# Patient Record
Sex: MALE | Race: ASIAN | NOT HISPANIC OR LATINO | URBAN - METROPOLITAN AREA
[De-identification: names, ages, dates, MRNs, and addresses within clinical notes are randomized per-mention and may not be internally consistent; named-entity substitution may affect disease eponyms.]

---

## 2019-06-05 ENCOUNTER — EMERGENCY (EMERGENCY)
Facility: HOSPITAL | Age: 49
LOS: 1 days | Discharge: HOME | End: 2019-06-05
Attending: EMERGENCY MEDICINE
Payer: COMMERCIAL

## 2019-06-05 VITALS
WEIGHT: 160.06 LBS | OXYGEN SATURATION: 97 % | HEIGHT: 68 IN | HEART RATE: 68 BPM | SYSTOLIC BLOOD PRESSURE: 129 MMHG | RESPIRATION RATE: 20 BRPM | TEMPERATURE: 98 F | DIASTOLIC BLOOD PRESSURE: 79 MMHG

## 2019-06-05 VITALS
HEART RATE: 68 BPM | OXYGEN SATURATION: 96 % | SYSTOLIC BLOOD PRESSURE: 112 MMHG | TEMPERATURE: 98 F | RESPIRATION RATE: 18 BRPM

## 2019-06-05 LAB
ALBUMIN SERPL ELPH-MCNC: 4.7 G/DL — SIGNIFICANT CHANGE UP (ref 3.3–5.2)
ALP SERPL-CCNC: 111 U/L — SIGNIFICANT CHANGE UP (ref 40–120)
ALT FLD-CCNC: 35 U/L — SIGNIFICANT CHANGE UP
ANION GAP SERPL CALC-SCNC: 16 MMOL/L — SIGNIFICANT CHANGE UP (ref 5–17)
APPEARANCE UR: CLEAR — SIGNIFICANT CHANGE UP
AST SERPL-CCNC: 34 U/L — SIGNIFICANT CHANGE UP
BACTERIA # UR AUTO: ABNORMAL
BASOPHILS # BLD AUTO: 0 K/UL — SIGNIFICANT CHANGE UP (ref 0–0.2)
BASOPHILS NFR BLD AUTO: 0.2 % — SIGNIFICANT CHANGE UP (ref 0–2)
BILIRUB SERPL-MCNC: 0.6 MG/DL — SIGNIFICANT CHANGE UP (ref 0.4–2)
BILIRUB UR-MCNC: NEGATIVE — SIGNIFICANT CHANGE UP
BUN SERPL-MCNC: 16 MG/DL — SIGNIFICANT CHANGE UP (ref 8–20)
CALCIUM SERPL-MCNC: 9.5 MG/DL — SIGNIFICANT CHANGE UP (ref 8.6–10.2)
CHLORIDE SERPL-SCNC: 103 MMOL/L — SIGNIFICANT CHANGE UP (ref 98–107)
CO2 SERPL-SCNC: 21 MMOL/L — LOW (ref 22–29)
COLOR SPEC: YELLOW — SIGNIFICANT CHANGE UP
CREAT SERPL-MCNC: 0.82 MG/DL — SIGNIFICANT CHANGE UP (ref 0.5–1.3)
DIFF PNL FLD: ABNORMAL
EOSINOPHIL # BLD AUTO: 0 K/UL — SIGNIFICANT CHANGE UP (ref 0–0.5)
EOSINOPHIL NFR BLD AUTO: 0.4 % — SIGNIFICANT CHANGE UP (ref 0–6)
EPI CELLS # UR: NEGATIVE — SIGNIFICANT CHANGE UP
GLUCOSE SERPL-MCNC: 120 MG/DL — HIGH (ref 70–115)
GLUCOSE UR QL: NEGATIVE MG/DL — SIGNIFICANT CHANGE UP
HCT VFR BLD CALC: 42.2 % — SIGNIFICANT CHANGE UP (ref 42–52)
HGB BLD-MCNC: 15.1 G/DL — SIGNIFICANT CHANGE UP (ref 14–18)
KETONES UR-MCNC: ABNORMAL
LEUKOCYTE ESTERASE UR-ACNC: ABNORMAL
LIDOCAIN IGE QN: 32 U/L — SIGNIFICANT CHANGE UP (ref 22–51)
LYMPHOCYTES # BLD AUTO: 1 K/UL — SIGNIFICANT CHANGE UP (ref 1–4.8)
LYMPHOCYTES # BLD AUTO: 12.6 % — LOW (ref 20–55)
MCHC RBC-ENTMCNC: 31.9 PG — HIGH (ref 27–31)
MCHC RBC-ENTMCNC: 35.8 G/DL — SIGNIFICANT CHANGE UP (ref 32–36)
MCV RBC AUTO: 89.2 FL — SIGNIFICANT CHANGE UP (ref 80–94)
MONOCYTES # BLD AUTO: 0.4 K/UL — SIGNIFICANT CHANGE UP (ref 0–0.8)
MONOCYTES NFR BLD AUTO: 5.5 % — SIGNIFICANT CHANGE UP (ref 3–10)
NEUTROPHILS # BLD AUTO: 6.6 K/UL — SIGNIFICANT CHANGE UP (ref 1.8–8)
NEUTROPHILS NFR BLD AUTO: 80.8 % — HIGH (ref 37–73)
NITRITE UR-MCNC: NEGATIVE — SIGNIFICANT CHANGE UP
PH UR: 6 — SIGNIFICANT CHANGE UP (ref 5–8)
PLATELET # BLD AUTO: 228 K/UL — SIGNIFICANT CHANGE UP (ref 150–400)
POTASSIUM SERPL-MCNC: 4.6 MMOL/L — SIGNIFICANT CHANGE UP (ref 3.5–5.3)
POTASSIUM SERPL-SCNC: 4.6 MMOL/L — SIGNIFICANT CHANGE UP (ref 3.5–5.3)
PROT SERPL-MCNC: 7.5 G/DL — SIGNIFICANT CHANGE UP (ref 6.6–8.7)
PROT UR-MCNC: 30 MG/DL
RBC # BLD: 4.73 M/UL — SIGNIFICANT CHANGE UP (ref 4.6–6.2)
RBC # FLD: 12.5 % — SIGNIFICANT CHANGE UP (ref 11–15.6)
RBC CASTS # UR COMP ASSIST: ABNORMAL /HPF (ref 0–4)
SODIUM SERPL-SCNC: 140 MMOL/L — SIGNIFICANT CHANGE UP (ref 135–145)
SP GR SPEC: 1.02 — SIGNIFICANT CHANGE UP (ref 1.01–1.02)
UROBILINOGEN FLD QL: NEGATIVE MG/DL — SIGNIFICANT CHANGE UP
WBC # BLD: 8.2 K/UL — SIGNIFICANT CHANGE UP (ref 4.8–10.8)
WBC # FLD AUTO: 8.2 K/UL — SIGNIFICANT CHANGE UP (ref 4.8–10.8)
WBC UR QL: SIGNIFICANT CHANGE UP

## 2019-06-05 PROCEDURE — 96361 HYDRATE IV INFUSION ADD-ON: CPT

## 2019-06-05 PROCEDURE — 99285 EMERGENCY DEPT VISIT HI MDM: CPT

## 2019-06-05 PROCEDURE — 83690 ASSAY OF LIPASE: CPT

## 2019-06-05 PROCEDURE — 96375 TX/PRO/DX INJ NEW DRUG ADDON: CPT

## 2019-06-05 PROCEDURE — 99284 EMERGENCY DEPT VISIT MOD MDM: CPT | Mod: 25

## 2019-06-05 PROCEDURE — 80053 COMPREHEN METABOLIC PANEL: CPT

## 2019-06-05 PROCEDURE — 74176 CT ABD & PELVIS W/O CONTRAST: CPT | Mod: 26

## 2019-06-05 PROCEDURE — 85027 COMPLETE CBC AUTOMATED: CPT

## 2019-06-05 PROCEDURE — 74176 CT ABD & PELVIS W/O CONTRAST: CPT

## 2019-06-05 PROCEDURE — 81001 URINALYSIS AUTO W/SCOPE: CPT

## 2019-06-05 PROCEDURE — 36415 COLL VENOUS BLD VENIPUNCTURE: CPT

## 2019-06-05 PROCEDURE — 96374 THER/PROPH/DIAG INJ IV PUSH: CPT

## 2019-06-05 PROCEDURE — 96376 TX/PRO/DX INJ SAME DRUG ADON: CPT

## 2019-06-05 RX ORDER — MORPHINE SULFATE 50 MG/1
4 CAPSULE, EXTENDED RELEASE ORAL ONCE
Refills: 0 | Status: DISCONTINUED | OUTPATIENT
Start: 2019-06-05 | End: 2019-06-05

## 2019-06-05 RX ORDER — IBUPROFEN 200 MG
1 TABLET ORAL
Qty: 30 | Refills: 0
Start: 2019-06-05 | End: 2019-06-11

## 2019-06-05 RX ORDER — SODIUM CHLORIDE 9 MG/ML
1000 INJECTION INTRAMUSCULAR; INTRAVENOUS; SUBCUTANEOUS ONCE
Refills: 0 | Status: COMPLETED | OUTPATIENT
Start: 2019-06-05 | End: 2019-06-05

## 2019-06-05 RX ORDER — TAMSULOSIN HYDROCHLORIDE 0.4 MG/1
1 CAPSULE ORAL
Qty: 15 | Refills: 0
Start: 2019-06-05 | End: 2019-06-19

## 2019-06-05 RX ORDER — KETOROLAC TROMETHAMINE 30 MG/ML
30 SYRINGE (ML) INJECTION ONCE
Refills: 0 | Status: DISCONTINUED | OUTPATIENT
Start: 2019-06-05 | End: 2019-06-05

## 2019-06-05 RX ORDER — HYDROMORPHONE HYDROCHLORIDE 2 MG/ML
0.5 INJECTION INTRAMUSCULAR; INTRAVENOUS; SUBCUTANEOUS ONCE
Refills: 0 | Status: DISCONTINUED | OUTPATIENT
Start: 2019-06-05 | End: 2019-06-05

## 2019-06-05 RX ORDER — TAMSULOSIN HYDROCHLORIDE 0.4 MG/1
0.4 CAPSULE ORAL ONCE
Refills: 0 | Status: COMPLETED | OUTPATIENT
Start: 2019-06-05 | End: 2019-06-05

## 2019-06-05 RX ORDER — HYDROMORPHONE HYDROCHLORIDE 2 MG/ML
0.5 INJECTION INTRAMUSCULAR; INTRAVENOUS; SUBCUTANEOUS EVERY 4 HOURS
Refills: 0 | Status: DISCONTINUED | OUTPATIENT
Start: 2019-06-05 | End: 2019-06-05

## 2019-06-05 RX ORDER — ONDANSETRON 8 MG/1
4 TABLET, FILM COATED ORAL ONCE
Refills: 0 | Status: COMPLETED | OUTPATIENT
Start: 2019-06-05 | End: 2019-06-05

## 2019-06-05 RX ADMIN — HYDROMORPHONE HYDROCHLORIDE 0.5 MILLIGRAM(S): 2 INJECTION INTRAMUSCULAR; INTRAVENOUS; SUBCUTANEOUS at 17:00

## 2019-06-05 RX ADMIN — MORPHINE SULFATE 4 MILLIGRAM(S): 50 CAPSULE, EXTENDED RELEASE ORAL at 16:25

## 2019-06-05 RX ADMIN — SODIUM CHLORIDE 1000 MILLILITER(S): 9 INJECTION INTRAMUSCULAR; INTRAVENOUS; SUBCUTANEOUS at 17:10

## 2019-06-05 RX ADMIN — TAMSULOSIN HYDROCHLORIDE 0.4 MILLIGRAM(S): 0.4 CAPSULE ORAL at 17:54

## 2019-06-05 RX ADMIN — MORPHINE SULFATE 4 MILLIGRAM(S): 50 CAPSULE, EXTENDED RELEASE ORAL at 16:10

## 2019-06-05 RX ADMIN — Medication 30 MILLIGRAM(S): at 16:48

## 2019-06-05 RX ADMIN — Medication 30 MILLIGRAM(S): at 16:45

## 2019-06-05 RX ADMIN — SODIUM CHLORIDE 1000 MILLILITER(S): 9 INJECTION INTRAMUSCULAR; INTRAVENOUS; SUBCUTANEOUS at 16:10

## 2019-06-05 RX ADMIN — HYDROMORPHONE HYDROCHLORIDE 0.5 MILLIGRAM(S): 2 INJECTION INTRAMUSCULAR; INTRAVENOUS; SUBCUTANEOUS at 17:54

## 2019-06-05 RX ADMIN — HYDROMORPHONE HYDROCHLORIDE 0.5 MILLIGRAM(S): 2 INJECTION INTRAMUSCULAR; INTRAVENOUS; SUBCUTANEOUS at 16:50

## 2019-06-05 RX ADMIN — HYDROMORPHONE HYDROCHLORIDE 0.5 MILLIGRAM(S): 2 INJECTION INTRAMUSCULAR; INTRAVENOUS; SUBCUTANEOUS at 18:00

## 2019-06-05 RX ADMIN — ONDANSETRON 4 MILLIGRAM(S): 8 TABLET, FILM COATED ORAL at 16:10

## 2019-06-05 NOTE — ED ADULT NURSE NOTE - NSIMPLEMENTINTERV_GEN_ALL_ED
Implemented All Universal Safety Interventions:  [place] to call system. Call bell, personal items and telephone within reach. Instruct patient to call for assistance. Room bathroom lighting operational. Non-slip footwear when patient is off stretcher. Physically safe environment: no spills, clutter or unnecessary equipment. Stretcher in lowest position, wheels locked, appropriate side rails in place.

## 2019-06-05 NOTE — ED STATDOCS - OBJECTIVE STATEMENT
50 y/o M pt with hx of renal stones, HTN and HLD presents to ED c/o worsening sudden onset of RLQ pain starting today at approx. 12:30PM, with assoc. nausea and vomiting. Pt's pain relieves while lying flat on his back. Pt has had similar pain 4-5 times in the past, last hx of renal stones was 4 years ago. States his stones passed on their own with medication. Pt is currently on Alpuniril, Amlodipine and Lipitor. NKDA. Pt is a nonsmoker. Denies back pain, diarrhea and fever. Denies prior hx of kidney dz. No further complaints at this time. 50 y/o M pt with hx of renal stones, HTN and HLD presents to ED c/o worsening sudden onset of RLQ pain starting today at approx. 12:30PM, with assoc. nausea and vomiting. Pt's pain relieves while lying flat on his back. Pt has had similar pain 4-5 times in the past, last hx of renal stones was 4 years ago. States his stones passed on their own with medication. Pt is currently on Allopurinol, Amlodipine and Lipitor. NKDA. Pt is a nonsmoker. Denies back pain, diarrhea and fever. Denies prior hx of kidney dz. No further complaints at this time.

## 2019-06-05 NOTE — ED STATDOCS - ATTENDING CONTRIBUTION TO CARE
I, Dilia Barrett, performed the initial face to face bedside interview with this patient regarding history of present illness, review of symptoms and relevant past medical, social and family history.  I completed an independent physical examination.  I was the initial provider who evaluated this patient. I have signed out the follow up of any pending tests (i.e. labs, radiological studies) to the ACP.  I have communicated the patient’s plan of care and disposition with the ACP.  The history, relevant review of systems, past medical and surgical history, medical decision making, and physical examination was documented by the scribe in my presence and I attest to the accuracy of the documentation.

## 2019-06-05 NOTE — ED STATDOCS - PHYSICAL EXAMINATION
VS:  unremarkable    GEN - NAD; well appearing; A+O x3   HEAD - NC/AT     ENT - PEERL, EOMI, mucous membranes  moist , no discharge      NECK: Neck supple, non-tender without lymphadenopathy, no masses, no JVD  PULM - CTA b/l,  symmetric breath sounds  COR -  normal heart sounds    ABD - tender to RLQ, no guarding, no rebound, no masses    BACK - R CVA tenderness, nontender spine     EXTREMS - no edema, no deformity, warm and well perfused    SKIN - no rash or bruising      NEUROLOGIC - alert, CN 2-12 intact, sensation nl, motor 5/5 RUE/LUE/RLE/LLE.      IMP: GEN - NAD; well appearing; A+O x3   HEAD - NC/AT     ENT - PEERL, EOMI, mucous membranes  moist , no discharge      NECK: Neck supple, non-tender without lymphadenopathy, no masses, no JVD  PULM - CTA b/l,  symmetric breath sounds  COR -  normal heart sounds    ABD - tender to RLQ, no guarding, no rebound, no masses ; normal BS; no distension.  BACK - R CVA tenderness, nontender spine     EXTREMS - no edema, no deformity, warm and well perfused    SKIN - no rash or bruising      NEUROLOGIC - alert, CN 2-12 intact, sensation nl, motor 5/5 RUE/LUE/RLE/LLE.

## 2019-06-05 NOTE — ED STATDOCS - PROGRESS NOTE DETAILS
Results noted and findings d/w pt. + kidney stone 3.4mm UVJ with mild hydro. Pain currently controlled. Will continue to observe and likely dc home on flomax, pain meds and urology f/u

## 2019-07-22 ENCOUNTER — APPOINTMENT (OUTPATIENT)
Dept: UROLOGY | Facility: CLINIC | Age: 49
End: 2019-07-22
Payer: MEDICAID

## 2019-07-22 VITALS
HEART RATE: 62 BPM | RESPIRATION RATE: 16 BRPM | DIASTOLIC BLOOD PRESSURE: 79 MMHG | WEIGHT: 160 LBS | SYSTOLIC BLOOD PRESSURE: 130 MMHG | HEIGHT: 67 IN | TEMPERATURE: 98.2 F | BODY MASS INDEX: 25.11 KG/M2

## 2019-07-22 PROCEDURE — 99203 OFFICE O/P NEW LOW 30 MIN: CPT

## 2019-07-22 NOTE — PHYSICAL EXAM
[General Appearance - Well Developed] : well developed [Normal Appearance] : normal appearance [General Appearance - Well Nourished] : well nourished [Well Groomed] : well groomed [General Appearance - In No Acute Distress] : no acute distress [Edema] : no peripheral edema [Exaggerated Use Of Accessory Muscles For Inspiration] : no accessory muscle use [Abdomen Soft] : soft [Respiration, Rhythm And Depth] : normal respiratory rhythm and effort [Abdomen Tenderness] : non-tender [Costovertebral Angle Tenderness] : no ~M costovertebral angle tenderness [Urethral Meatus] : meatus normal [Scrotum] : the scrotum was normal [No Prostate Nodules] : no prostate nodules [Testes Mass (___cm)] : there were no testicular masses [Urinary Bladder Findings] : the bladder was normal on palpation [] : no rash [Normal Station and Gait] : the gait and station were normal for the patient's age [Oriented To Time, Place, And Person] : oriented to person, place, and time [Affect] : the affect was normal [No Focal Deficits] : no focal deficits [Mood] : the mood was normal [Not Anxious] : not anxious [No Palpable Adenopathy] : no palpable adenopathy

## 2019-07-22 NOTE — LETTER BODY
[FreeTextEntry1] : Elliot Avendano MD\par [address]\[place] [address]\par \par Dear Dr. Avendano,\par \par Reason for visit: Abnormal urinalysis. History of renal stones\par \par This is a 49 year-old Mandarin-speaking gentleman with a history of renal stones, presenting with abnormal urinalysis. Patient is here today for evaluation, accompanied by his wife. He was last seen by me 3 years ago for renal stones. He was supposed to obtain a CT scan, but never followed up. He recently obtained an unremarkable ultrasound. Urinalysis report demonstrated abnormal albumin/creatinine ratio. He denies any pain. The patient denies any aggravating or relieving factors. The patient denies any interference of function. The patient is entirely asymptomatic. All other review of systems are negative. He has no cancer in his family medical history. He has no previous surgical history. Past medical history, family history and social history were inquired and were noncontributory to current condition. The patient does not use tobacco or drink alcohol. Medications and allergies were reviewed. He has no known allergies to medication. \par \par On examination, the patient is a healthy-appearing gentleman in no acute distress. He is alert and oriented follows commands. He has normal mood and affect. He is normocephalic. Neck is supple. Respirations are unlabored. His abdomen is soft and nontender. Bladder is nonpalpable. No CVA tenderness. Neurologically he is grossly intact. No peripheral edema. Skin without gross abnormality. He has normal male external genitalia. Normal meatus. Bilateral testes are descended intrascrotally and normal to palpation. On rectal examination, there is normal sphincter tone. The prostate is clinically benign without focal induration or nodularity.\par \par His recent PSA was 0.85, which is within normal limits. His urinalysis report demonstrated no evidence of protein in urine. His recent ultrasound was negative. \par \par Assessment: Abnormal urinalysis, possible proteinuria.\par \par I counseled the patient on the various etiologies of proteinuria. His recent urinalysis demonstrated no protein in the urine. I recommend he conduct a 24 hour urine collection to examine for protein in order to ensure stability.  I counseled the patient regarding the procedure. The risks and benefits were discussed. Alternatives were given. I answered the patient questions. The patient will take the necessary preparations for the procedure. The patient will follow-up as directed and will contact me with any questions or concerns. Thank you for the opportunity to participate in the care of Mr. SANCHEZ. I will keep you updated on his progress. \par \par Plan: 24 hour urine collection for protein. Follow up as directed.

## 2019-07-22 NOTE — ADDENDUM
[FreeTextEntry1] : Entered by Jonh Lauren, acting as scribe for Dr. Porfirio Jenkins.\par \par The documentation recorded by the scribe accurately reflects the service I personally performed and the decisions made by me.

## 2019-08-05 ENCOUNTER — CLINICAL ADVICE (OUTPATIENT)
Age: 49
End: 2019-08-05

## 2020-02-14 ENCOUNTER — EMERGENCY (EMERGENCY)
Facility: HOSPITAL | Age: 50
LOS: 1 days | End: 2020-02-14
Attending: STUDENT IN AN ORGANIZED HEALTH CARE EDUCATION/TRAINING PROGRAM | Admitting: HOSPITALIST
Payer: COMMERCIAL

## 2020-02-14 ENCOUNTER — TRANSCRIPTION ENCOUNTER (OUTPATIENT)
Age: 50
End: 2020-02-14

## 2020-02-14 VITALS
WEIGHT: 160.06 LBS | RESPIRATION RATE: 16 BRPM | TEMPERATURE: 97 F | OXYGEN SATURATION: 97 % | SYSTOLIC BLOOD PRESSURE: 145 MMHG | HEIGHT: 66 IN | DIASTOLIC BLOOD PRESSURE: 95 MMHG | HEART RATE: 87 BPM

## 2020-02-14 LAB
ALBUMIN SERPL ELPH-MCNC: 4.4 G/DL — SIGNIFICANT CHANGE UP (ref 3.3–5)
ALP SERPL-CCNC: 105 U/L — SIGNIFICANT CHANGE UP (ref 40–120)
ALT FLD-CCNC: 76 U/L — HIGH (ref 10–45)
ANION GAP SERPL CALC-SCNC: 14 MMOL/L — SIGNIFICANT CHANGE UP (ref 5–17)
AST SERPL-CCNC: 37 U/L — SIGNIFICANT CHANGE UP (ref 10–40)
BILIRUB SERPL-MCNC: 0.4 MG/DL — SIGNIFICANT CHANGE UP (ref 0.2–1.2)
BUN SERPL-MCNC: 11 MG/DL — SIGNIFICANT CHANGE UP (ref 7–23)
CALCIUM SERPL-MCNC: 9.3 MG/DL — SIGNIFICANT CHANGE UP (ref 8.4–10.5)
CHLORIDE SERPL-SCNC: 103 MMOL/L — SIGNIFICANT CHANGE UP (ref 96–108)
CO2 SERPL-SCNC: 23 MMOL/L — SIGNIFICANT CHANGE UP (ref 22–31)
CREAT SERPL-MCNC: 0.67 MG/DL — SIGNIFICANT CHANGE UP (ref 0.5–1.3)
GLUCOSE SERPL-MCNC: 102 MG/DL — HIGH (ref 70–99)
HCT VFR BLD CALC: 41.4 % — SIGNIFICANT CHANGE UP (ref 39–50)
HGB BLD-MCNC: 14.4 G/DL — SIGNIFICANT CHANGE UP (ref 13–17)
MAGNESIUM SERPL-MCNC: 2.2 MG/DL — SIGNIFICANT CHANGE UP (ref 1.6–2.6)
MCHC RBC-ENTMCNC: 31.5 PG — SIGNIFICANT CHANGE UP (ref 27–34)
MCHC RBC-ENTMCNC: 34.8 GM/DL — SIGNIFICANT CHANGE UP (ref 32–36)
MCV RBC AUTO: 90.6 FL — SIGNIFICANT CHANGE UP (ref 80–100)
NRBC # BLD: 0 /100 WBCS — SIGNIFICANT CHANGE UP (ref 0–0)
PLATELET # BLD AUTO: 199 K/UL — SIGNIFICANT CHANGE UP (ref 150–400)
POTASSIUM SERPL-MCNC: 3.9 MMOL/L — SIGNIFICANT CHANGE UP (ref 3.5–5.3)
POTASSIUM SERPL-SCNC: 3.9 MMOL/L — SIGNIFICANT CHANGE UP (ref 3.5–5.3)
PROT SERPL-MCNC: 7.2 G/DL — SIGNIFICANT CHANGE UP (ref 6–8.3)
RBC # BLD: 4.57 M/UL — SIGNIFICANT CHANGE UP (ref 4.2–5.8)
RBC # FLD: 11.9 % — SIGNIFICANT CHANGE UP (ref 10.3–14.5)
SODIUM SERPL-SCNC: 140 MMOL/L — SIGNIFICANT CHANGE UP (ref 135–145)
TROPONIN T, HIGH SENSITIVITY RESULT: <6 NG/L — SIGNIFICANT CHANGE UP (ref 0–51)
WBC # BLD: 4.83 K/UL — SIGNIFICANT CHANGE UP (ref 3.8–10.5)
WBC # FLD AUTO: 4.83 K/UL — SIGNIFICANT CHANGE UP (ref 3.8–10.5)

## 2020-02-14 RX ORDER — ASPIRIN/CALCIUM CARB/MAGNESIUM 324 MG
324 TABLET ORAL ONCE
Refills: 0 | Status: COMPLETED | OUTPATIENT
Start: 2020-02-14 | End: 2020-02-14

## 2020-02-14 RX ADMIN — Medication 324 MILLIGRAM(S): at 23:21

## 2020-02-14 NOTE — ED ADULT NURSE NOTE - OBJECTIVE STATEMENT
50 YO male with PMH HTN, HLD, DM on metformin, BPH, & kidney stone on Flomax, via walk in presenting with complaints of chest pain. As per patient, at 1900, pt developed sudden onset chest pain, described as 6/10 aching to the sternal and epigastric region, that worsens with movement, and breathing. Pt denies any recent falls or trauma. Pt denies visual disturbances, numbness/tingling, fever, chills, diaphoresis, headache, nausea, vomiting, constipation, diarrhea, or urinary symptoms.   Pt Axox4, gross neuro intact, PERRL 3 mm. Lungs clear throughout bilateral. S1S2 heard, normal sinus on cardiac monitor. Abdomen soft, non-tender, non-distended. Skin warm, dry, and intact. Pt placed in position of comfort. Pt educated on call bell system and provided call bell. Bed in lowest position, wheels locked, appropriate side rails raised. Pt denies needs at this time.  family present at bedside.

## 2020-02-14 NOTE — ED PROVIDER NOTE - PROGRESS NOTE DETAILS
Anjana PGY3: Pt assessed at beside. Pt resting comfortably, pain controlled, pt questions answered. Vital signs stable. Still with chest pain in ED despite meds, repeat ekg unchanged will admit Anjana PGY3: Pt assessed at beside. Pt resting comfortably, comfortable appearing, pt questions answered. Vital signs stable. Still with chest pain in ED despite meds, repeat ekg unchanged will admit

## 2020-02-14 NOTE — ED PROVIDER NOTE - PHYSICAL EXAMINATION
GEN APPEARANCE: WDWN, alert and cooperative, non-toxic appearing and in NAD  HEAD: Atraumatic, normocephalic   EYES: PERRLa, EOMI, vision grossly intact.   EARS: Gross hearing intact.   NOSE: No nasal discharge, no external evidence of epistaxis.   NECK: Supple  CV: RRR, S1S2, no c/r/m/g. No cyanosis or pallor. Extremities warm, well perfused. Cap refill <2 seconds. No bruits.   LUNGS: CTAB. No wheezing. No rales. No rhonchi. No diminished breath sounds.   ABDOMEN: Soft, NTND. No guarding or rebound. No masses.   MSK: Spine appears normal, no spine point tenderness. No CVA ttp. No joint erythema or tenderness. Normal muscular development. Pelvis stable.  EXTREMITIES: No peripheral edema. No obvious joint or bony deformity.  NEURO: Alert, follows commands. Speech normal. Sensation and motor normal x4 extremities.   SKIN: Normal color for race, warm, dry and intact. No evidence of rash.  PSYCH: Normal mood and affect.

## 2020-02-14 NOTE — ED PROVIDER NOTE - OBJECTIVE STATEMENT
49M hx HTN DM HLD renal stones "intraabdominal infection" on flagyl, presents with a cc of non external sudden onset substernal/L sided chest pain, worse with body movements, deep inspiration, feels as if cannot take deep breath, no recent cough congestion runny nose or sore throat, last had stress x1 year ago which was told normal, non smoker, no prior dvt/pe. No ASA prior to arrival. Former smoker. Denies n/v/f/c/. Denies headache, syncope, lightheadedness, dizziness. Denies chest palpitations, abdominal pain. Denies dysuria, hematuria, hematochezia, BRBPR, tarry stools, diarrhea, constipation.

## 2020-02-14 NOTE — ED PROVIDER NOTE - ATTENDING CONTRIBUTION TO CARE
49 M p/w wife w/ hx of DM/HTN p/w cp since 7 pm nonradiating in the middle of the chest, nothing makes it better or worse, no associated n/v/diaphoresis, no radiation of the pain down the arm nor to the jaw, pt has never had this happen before, constant pain. Pt nontoxic appearing, cp not reproducible w/ palpation 2_ radial pulse w/ no lower extremity edema, abdomen soft, nt nd, no cva tenderness plan for labs ekg w/ q waves in inferior leads, and cxr, case signed out to Dr. Jones pending labs at 4514

## 2020-02-15 ENCOUNTER — TRANSCRIPTION ENCOUNTER (OUTPATIENT)
Age: 50
End: 2020-02-15

## 2020-02-15 VITALS
HEART RATE: 69 BPM | SYSTOLIC BLOOD PRESSURE: 138 MMHG | TEMPERATURE: 98 F | DIASTOLIC BLOOD PRESSURE: 87 MMHG | RESPIRATION RATE: 18 BRPM | OXYGEN SATURATION: 98 %

## 2020-02-15 DIAGNOSIS — Z02.9 ENCOUNTER FOR ADMINISTRATIVE EXAMINATIONS, UNSPECIFIED: ICD-10-CM

## 2020-02-15 DIAGNOSIS — R07.9 CHEST PAIN, UNSPECIFIED: ICD-10-CM

## 2020-02-15 LAB
D DIMER BLD IA.RAPID-MCNC: <150 NG/ML DDU — SIGNIFICANT CHANGE UP
TROPONIN T, HIGH SENSITIVITY RESULT: <6 NG/L — SIGNIFICANT CHANGE UP (ref 0–51)

## 2020-02-15 PROCEDURE — 83735 ASSAY OF MAGNESIUM: CPT

## 2020-02-15 PROCEDURE — 80053 COMPREHEN METABOLIC PANEL: CPT

## 2020-02-15 PROCEDURE — 93005 ELECTROCARDIOGRAM TRACING: CPT

## 2020-02-15 PROCEDURE — 85379 FIBRIN DEGRADATION QUANT: CPT

## 2020-02-15 PROCEDURE — 99285 EMERGENCY DEPT VISIT HI MDM: CPT | Mod: 25

## 2020-02-15 PROCEDURE — 96375 TX/PRO/DX INJ NEW DRUG ADDON: CPT

## 2020-02-15 PROCEDURE — 71046 X-RAY EXAM CHEST 2 VIEWS: CPT

## 2020-02-15 PROCEDURE — 85027 COMPLETE CBC AUTOMATED: CPT

## 2020-02-15 PROCEDURE — 84484 ASSAY OF TROPONIN QUANT: CPT

## 2020-02-15 PROCEDURE — 96374 THER/PROPH/DIAG INJ IV PUSH: CPT

## 2020-02-15 RX ORDER — TAMSULOSIN HYDROCHLORIDE 0.4 MG/1
0.4 CAPSULE ORAL AT BEDTIME
Refills: 0 | Status: DISCONTINUED | OUTPATIENT
Start: 2020-02-15 | End: 2020-02-15

## 2020-02-15 RX ORDER — LIDOCAINE 4 G/100G
15 CREAM TOPICAL ONCE
Refills: 0 | Status: COMPLETED | OUTPATIENT
Start: 2020-02-15 | End: 2020-02-15

## 2020-02-15 RX ORDER — KETOROLAC TROMETHAMINE 30 MG/ML
15 SYRINGE (ML) INJECTION ONCE
Refills: 0 | Status: DISCONTINUED | OUTPATIENT
Start: 2020-02-15 | End: 2020-02-15

## 2020-02-15 RX ORDER — TAMSULOSIN HYDROCHLORIDE 0.4 MG/1
1 CAPSULE ORAL
Qty: 0 | Refills: 0 | DISCHARGE
Start: 2020-02-15

## 2020-02-15 RX ORDER — ACETAMINOPHEN 500 MG
975 TABLET ORAL ONCE
Refills: 0 | Status: COMPLETED | OUTPATIENT
Start: 2020-02-15 | End: 2020-02-15

## 2020-02-15 RX ORDER — INFLUENZA VIRUS VACCINE 15; 15; 15; 15 UG/.5ML; UG/.5ML; UG/.5ML; UG/.5ML
0.5 SUSPENSION INTRAMUSCULAR ONCE
Refills: 0 | Status: DISCONTINUED | OUTPATIENT
Start: 2020-02-15 | End: 2020-02-15

## 2020-02-15 RX ORDER — FAMOTIDINE 10 MG/ML
20 INJECTION INTRAVENOUS ONCE
Refills: 0 | Status: COMPLETED | OUTPATIENT
Start: 2020-02-15 | End: 2020-02-15

## 2020-02-15 RX ADMIN — Medication 15 MILLIGRAM(S): at 03:00

## 2020-02-15 RX ADMIN — Medication 30 MILLILITER(S): at 02:22

## 2020-02-15 RX ADMIN — FAMOTIDINE 20 MILLIGRAM(S): 10 INJECTION INTRAVENOUS at 02:21

## 2020-02-15 RX ADMIN — Medication 975 MILLIGRAM(S): at 01:27

## 2020-02-15 RX ADMIN — LIDOCAINE 15 MILLILITER(S): 4 CREAM TOPICAL at 02:23

## 2020-02-15 RX ADMIN — Medication 975 MILLIGRAM(S): at 00:57

## 2020-02-15 RX ADMIN — Medication 15 MILLIGRAM(S): at 02:21

## 2020-02-15 NOTE — CHART NOTE - NSCHARTNOTEFT_GEN_A_CORE
Discharge note:    Patient discharged from emergency room. At this point I do not think there are any indications for further inpatient management. Plan discussed with patient and relative at bedside. Patient comfortable and in no acute distress. Chest pain improved. Strongly suspect musculoskeletal pain. Low suspicion for primary cardiac or pulmonary. Patient's relative asked me if it could be due to acid reflux which he has been experiencing. Though I did entertain the thought of atypical presentation of CAD, again the EKG, troponins and clinical picture do not support this. If it were GERD, no indication for inpatient admission either given normal CBC and lack of alarm symptoms. Patient told to follow up with PMD within the week. Return precautions given.    Discharge time <30 minutes.

## 2020-02-15 NOTE — H&P ADULT - HISTORY OF PRESENT ILLNESS
49M with PMHx of HTN, T2DM, and nephrolithiasis presents to ED with several hour history of chest pain. Patient states he was sitting down when he went to get up and felt a sudden sharp mid-sternal chest pain that he rated as about 6/10 in severity. Patient states that the pain was worsened with deep breaths, but minimal with shallow breaths. He denies any radiation, nausea, vomiting, or palpitations. Patient states he has never had this pain before. He had a stress test approximately one year ago which was normal. Patient states that today he may have strained himself when lifting a pack of water at [employer]. While here patient developed sperate back pain which he attributes to an uncomfortable stretcher. While here patient had an EKG reviewed by me, no ST elevation or MT depression. Patient given aspirin, Maalox, Tylenol, Pepcid, and Toradol with improvement in symptoms, he is not sure what worked exactly. Patient seen walking around ED and endorsing that he wants to go home. He was admitted for stress test despite having a recent negative stress test.

## 2020-02-15 NOTE — H&P ADULT - NSHPREVIEWOFSYSTEMS_GEN_ALL_CORE
Gen: no night sweats or change in appetite  Eyes: no changes in vision or diplopia   ENT: no epistaxis, sinus pain, gingival bleeding, odynophagia or dysphagia  CV: no CP, PND or palpitations  Resp: no cough, wheezing, or hemoptysis  GI: no hematemesis, hematochezia, or melena  : no dysuria, polyuria, or hematuria  MSK: no arthralgias or joint swelling   Neuro: no gross sensory changes, numbness, focal deficits  Psych: no depression or changes in concentration  Heme/Onc: no purpura, petechiae or night sweats  Skin: no pruritus, hair loss or skin lesions  All: no photosensitivity, no complaints of anaphylaxis (SOB, throat swelling) Gen: no night sweats or change in appetite  Eyes: no changes in vision or diplopia   ENT: no epistaxis, sinus pain, gingival bleeding, odynophagia or dysphagia  CV: +CP, no palpitations  Resp: no cough, wheezing, or hemoptysis  GI: no hematemesis, hematochezia, or melena  : no dysuria, polyuria, or hematuria  MSK: no arthralgias or joint swelling   Neuro: no gross sensory changes, numbness, focal deficits  Psych: no depression or changes in concentration  Heme/Onc: no purpura, petechiae or night sweats  Skin: no pruritus, hair loss or skin lesions  All: no photosensitivity, no complaints of anaphylaxis (SOB, throat swelling)

## 2020-02-15 NOTE — ED ADULT NURSE REASSESSMENT NOTE - NS ED NURSE REASSESS COMMENT FT1
Pt admitted and received admission bed on [facility]TOWER. Pt profile and discharge note completed. Pt evaluated by admitting doctor Frankie Palumbo MD. PT verbalized understanding of discharge instructions and plan of care. ED ADULT NURSE COMPLETED.
Pt reports feeling improved after receiving medications for pain. Anjana CARRION aware. Pt updated on plan of care. Pt placed in position of comfort. Pt educated on call bell system and provided call bell. Bed in lowest position, wheels locked, appropriate side rails raised. Pt denies needs at this time.

## 2020-02-15 NOTE — DISCHARGE NOTE PROVIDER - NSDCCPCAREPLAN_GEN_ALL_CORE_FT
PRINCIPAL DISCHARGE DIAGNOSIS  Diagnosis: Chest pain  Assessment and Plan of Treatment:       SECONDARY DISCHARGE DIAGNOSES  Diagnosis: Diabetes  Assessment and Plan of Treatment:     Diagnosis: Hypertension  Assessment and Plan of Treatment:

## 2020-02-15 NOTE — ED ADULT NURSE REASSESSMENT NOTE - PAIN: RESPONSE TO INTERVENTIONS
Message noted in chart.      ----- Message from Frances Lee sent at 10/14/2017  3:18 PM CDT -----  Regarding: Patient Already Scheduled  We attempted to contact the patient to schedule the [place] Cystogram that Dr. Langley had ordered. This appointment was scheduled for 11/7/17.    [place]   Pre-Services    
no change in pain
partial relief

## 2020-02-15 NOTE — DISCHARGE NOTE PROVIDER - NSDCMRMEDTOKEN_GEN_ALL_CORE_FT
atorvastatin 80 mg oral tablet: 1 tab(s) orally once a day  metFORMIN 1000 mg oral tablet: 1 tab(s) orally 2 times a day  tamsulosin 0.4 mg oral capsule: 1 cap(s) orally once a day (at bedtime)

## 2020-02-15 NOTE — H&P ADULT - ASSESSMENT
49M with PMHx of HTN, T2DM, and nephrolithiasis presents to ED with several hour history of chest pain. It happened suddenly and is very pleuritic in nature, now improved with analgesics and NSAIDs. Patient very well appearing. Troponins have been negative x2 several hours apart and vital signs have been stable. Chest pain is reproducible on examination. I suspect musculoskeletal etiology of chest pain. He has a recent history of exerting himself at [employer]. I don't suspect CAD given unremarkable EKG and negative serial troponins. In addition, patient with recent stress test one year prior and per him unremarkable. I don't suspect pericarditis given lack of viral symptoms and unremarkable EKG. I don't suspect PE given lack of respiratory symptoms, no hypoxia, Wells' Score of 0, and negative DDimer. He developed concurrent back pain attributed to lack of comfort of stretcher. It does not radiate and his BP has been stable; thus dissection is low on differential. Patient to be discharged home to follow up with PMD. No change made to his home medication.

## 2020-02-15 NOTE — H&P ADULT - NSHPPHYSICALEXAM_GEN_ALL_CORE
T(C): 36.6 (02-15-20 @ 03:46), Max: 36.6 (02-15-20 @ 03:46)  HR: 66 (02-15-20 @ 03:46) (66 - 87)  BP: 133/90 (02-15-20 @ 03:46) (110/69 - 145/95)  RR: 16 (02-15-20 @ 03:46) (16 - 16)  SpO2: 98% (02-15-20 @ 03:46) (97% - 98%)    Gen: (fe)male in NAD, appears comfortable, no diaphoresis  HEENT: NCAT, MMM, neck soft and supple  CV: +S1/S2, no m/r/g  Resp: CTAB, no w/r/r  GI: normoactive BS, soft, NTND, no rebounding/guarding  Ext: No LE edema, extremities appear warm and well perfused   Neuro: AOx3, no focal deficits, CNII-XII grossly intact  Psych: No SI/HI/AVH, appropriate affect  Skin: no petechiae, ecchymosis or maculopapular rash noted T(C): 36.6 (02-15-20 @ 03:46), Max: 36.6 (02-15-20 @ 03:46)  HR: 66 (02-15-20 @ 03:46) (66 - 87)  BP: 133/90 (02-15-20 @ 03:46) (110/69 - 145/95)  RR: 16 (02-15-20 @ 03:46) (16 - 16)  SpO2: 98% (02-15-20 @ 03:46) (97% - 98%)    Gen: male in NAD, appears comfortable, no diaphoresis  HEENT: NCAT, MMM, neck soft and supple  CV: +S1/S2, no m/r/g, reproducible pain in mid-sternum  Resp: CTAB, no w/r/r  GI: normoactive BS, soft, NTND, no rebounding/guarding  Ext: No LE edema, extremities appear warm and well perfused   Neuro: AOx3, no focal deficits, CNII-XII grossly intact  Psych: No SI/HI/AVH, appropriate affect  Skin: no petechiae, ecchymosis or maculopapular rash noted

## 2020-02-15 NOTE — DISCHARGE NOTE PROVIDER - HOSPITAL COURSE
49M with PMHx of HTN, T2DM, and nephrolithiasis presents to ED with several hour history of chest pain. It happened suddenly and is very pleuritic in nature, now improved with analgesics and NSAIDs. Patient very well appearing. Troponins have been negative x2 several hours apart and vital signs have been stable. Chest pain is reproducible on examination. I suspect musculoskeletal etiology of chest pain. He has a recent history of exerting himself at [facility] [facility] [employer]. I don't suspect CAD given unremarkable EKG and negative serial troponins. In addition, patient with recent stress test one year prior and per him unremarkable. I don't suspect pericarditis given lack of viral symptoms and unremarkable EKG. I don't suspect PE given lack of respiratory symptoms, no hypoxia, Wells' Score of 0, and negative DDimer. He developed concurrent back pain attributed to lack of comfort of stretcher. It does not radiate and his BP has been stable; thus dissection is low on differential. Patient to be discharged home to follow up with PMD. No change made to his home medication.

## 2020-02-28 ENCOUNTER — APPOINTMENT (OUTPATIENT)
Dept: UROLOGY | Facility: CLINIC | Age: 50
End: 2020-02-28
Payer: MEDICAID

## 2020-02-28 VITALS
HEART RATE: 85 BPM | OXYGEN SATURATION: 97 % | DIASTOLIC BLOOD PRESSURE: 77 MMHG | TEMPERATURE: 97.7 F | BODY MASS INDEX: 24.9 KG/M2 | WEIGHT: 159 LBS | SYSTOLIC BLOOD PRESSURE: 119 MMHG | RESPIRATION RATE: 17 BRPM

## 2020-02-28 PROBLEM — E11.9 TYPE 2 DIABETES MELLITUS WITHOUT COMPLICATIONS: Chronic | Status: ACTIVE | Noted: 2020-02-14

## 2020-02-28 PROBLEM — E78.5 HYPERLIPIDEMIA, UNSPECIFIED: Chronic | Status: ACTIVE | Noted: 2020-02-14

## 2020-02-28 PROCEDURE — 99214 OFFICE O/P EST MOD 30 MIN: CPT

## 2020-02-28 RX ORDER — TAMSULOSIN HYDROCHLORIDE 0.4 MG/1
CAPSULE ORAL
Refills: 0 | Status: ACTIVE | COMMUNITY

## 2020-02-28 NOTE — ADDENDUM
[FreeTextEntry1] : Entered by Terrell Faustin, acting as scribe for Dr. Porfirio Jenkins.\par \par The documentation recorded by the scribe accurately reflects the service I personally performed and the decisions made by me.

## 2020-02-28 NOTE — LETTER BODY
[FreeTextEntry1] : Elliot Avendano MD\par [address]\[place] [address]\par \par Dear Dr. Avendano,\par \par Reason for visit: Abnormal urinalysis. History of renal stones. Left renal colic. Left ureteral stone and hydronephrosis.\par \par This is a 50 year-old Mandarin-speaking gentleman with a history of elevated uric acid levels and renal stones, presenting with abnormal urinalysis. He is accompanied by his wife. Patient returns today for follow-up. Since his last visit, patient underwent CT scan demonstrating left hydronephrosis secondary to a renal stone. He reports left abdominal pain. His recent 24 hour protein total demonstrated proteinuria, 116 mg/24hr. He reports that he is currently taking Flomax. He denies any pain. The patient denies any aggravating or relieving factors. The patient denies any interference of function. The patient is entirely asymptomatic. All other review of systems are negative. Past medical history, family history and social history were inquired and were unchanged. Medications and allergies were reviewed. He has no known allergies to medication. \par \par On examination, the patient is a healthy-appearing gentleman in no acute distress. He is alert and oriented follows commands. He has normal mood and affect. He is normocephalic. Neck is supple. Respirations are unlabored. His abdomen is soft and nontender. Bladder is nonpalpable. No CVA tenderness. Neurologically he is grossly intact. No peripheral edema. Skin without gross abnormality. He has normal male external genitalia. Normal meatus. Bilateral testes are descended intrascrotally and normal to palpation. On rectal examination, there is normal sphincter tone. The prostate is clinically benign without focal induration or nodularity.\par \par Patient's 24 hour protein total demonstrated 116 mg/24 hr.\par \par Patient's recent CT scan demonstrated left hydronephrosis secondary to an ureteral stone.\par \par Assessment: Abnormal urinalysis, possible proteinuria. Left renal colic. Left hydronephrosis and ureteral stone.\par \par I counseled the patient. In terms of the patient's ureteral stone and hydronephrosis, I discussed his treatment options including left ureteroscopy or continued surveillance.  I counseled the patient regarding the procedure. The risks and benefits were discussed. Alternatives were given. I answered the patient questions. The patient will take the necessary preparations for the procedure, including Activated partial thromboplastin time, BMP, CBC w/ DIFF, culture, prothrombin Time and INR. Given the size of the stone, I advised that it is passable. I encouraged the patient continue taking Flomax and increase hydration to allow passage of the stone. Patient will not require surgical intervention if he passes his stone. Given his history of elevated uric acid levls, I recommend the patient obtain a KUB X-ray to evaluate for calcium oxalate stones. The risks and benefits were discussed. Alternatives were given. I answered the patient questions. . The patient will follow-up as directed and will contact me with any questions or concerns. Thank you for the opportunity to participate in the care of Mr. SANCHEZ. I will keep you updated on his progress. \par \par Plan: KUB X-ray. Left ureteroscopy. Activated partial thromboplastin time. BMP. CBC w/ DIFF. Culture. Prothrombin Time and INR. Follow up in two weeks.

## 2020-02-29 LAB
ANION GAP SERPL CALC-SCNC: 14 MMOL/L
APTT BLD: 28.5 SEC
BACTERIA UR CULT: NORMAL
BASOPHILS # BLD AUTO: 0.04 K/UL
BASOPHILS NFR BLD AUTO: 0.8 %
BUN SERPL-MCNC: 11 MG/DL
CALCIUM SERPL-MCNC: 10.4 MG/DL
CHLORIDE SERPL-SCNC: 102 MMOL/L
CO2 SERPL-SCNC: 26 MMOL/L
CREAT SERPL-MCNC: 0.85 MG/DL
EOSINOPHIL # BLD AUTO: 0.06 K/UL
EOSINOPHIL NFR BLD AUTO: 1.2 %
GLUCOSE SERPL-MCNC: 146 MG/DL
HCT VFR BLD CALC: 46.9 %
HGB BLD-MCNC: 16.2 G/DL
IMM GRANULOCYTES NFR BLD AUTO: 2 %
INR PPP: 0.9 RATIO
LYMPHOCYTES # BLD AUTO: 0.76 K/UL
LYMPHOCYTES NFR BLD AUTO: 15.4 %
MAN DIFF?: NORMAL
MCHC RBC-ENTMCNC: 31.6 PG
MCHC RBC-ENTMCNC: 34.5 GM/DL
MCV RBC AUTO: 91.6 FL
MONOCYTES # BLD AUTO: 0.36 K/UL
MONOCYTES NFR BLD AUTO: 7.3 %
NEUTROPHILS # BLD AUTO: 3.63 K/UL
NEUTROPHILS NFR BLD AUTO: 73.3 %
PLATELET # BLD AUTO: 222 K/UL
POTASSIUM SERPL-SCNC: 4.6 MMOL/L
PT BLD: 10.1 SEC
RBC # BLD: 5.12 M/UL
RBC # FLD: 12 %
SODIUM SERPL-SCNC: 141 MMOL/L
WBC # FLD AUTO: 4.95 K/UL

## 2020-03-17 ENCOUNTER — APPOINTMENT (OUTPATIENT)
Dept: UROLOGY | Facility: CLINIC | Age: 50
End: 2020-03-17

## 2020-03-25 ENCOUNTER — APPOINTMENT (OUTPATIENT)
Dept: UROLOGY | Facility: HOSPITAL | Age: 50
End: 2020-03-25

## 2020-04-22 ENCOUNTER — APPOINTMENT (OUTPATIENT)
Dept: UROLOGY | Facility: HOSPITAL | Age: 50
End: 2020-04-22

## 2020-05-07 ENCOUNTER — OUTPATIENT (OUTPATIENT)
Dept: OUTPATIENT SERVICES | Facility: HOSPITAL | Age: 50
LOS: 1 days | End: 2020-05-07
Payer: COMMERCIAL

## 2020-05-07 ENCOUNTER — APPOINTMENT (OUTPATIENT)
Dept: UROLOGY | Facility: CLINIC | Age: 50
End: 2020-05-07
Payer: MEDICAID

## 2020-05-07 DIAGNOSIS — R35.0 FREQUENCY OF MICTURITION: ICD-10-CM

## 2020-05-07 PROCEDURE — 76775 US EXAM ABDO BACK WALL LIM: CPT

## 2020-05-07 PROCEDURE — 76775 US EXAM ABDO BACK WALL LIM: CPT | Mod: 26

## 2020-05-07 PROCEDURE — 99213 OFFICE O/P EST LOW 20 MIN: CPT

## 2020-05-07 NOTE — LETTER BODY
[FreeTextEntry1] : Elliot Avendano MD\par [address]\[place] [address]\par \par Dear Dr. Avendano,\par \par Reason for visit: History of renal stones. Left renal colic. Left ureteral stone and hydronephrosis.\par \par This is a 50 year-old Mandarin-speaking gentleman with a history of elevated uric acid levels and renal stones, presenting with left renal colic. Patient returns today for renal ultrasound. Since his last visit, he obtained a KUB X-ray demonstrating evidence of a left ureteral stone. Patient complains of intermittent left flank pain. He denies any hematuria, dysuria, or urinary incontinence. He denies any fever or chills. He was scheduled to undergo left ureteroscopy, but was unable to because there is currently still a moratorium on surgery for the COVID-19 pandemic. He denies any changes in health. The past medical history, family history and social history are unchanged. All other review of systems are negative. Patient denies any changes in medications. Medication list was reconciled. \par \par On examination, the patient is a healthy-appearing gentleman in no acute distress. He is alert and oriented follows commands. He has normal mood and affect. He is normocephalic. Neck is supple. Respirations are unlabored. His abdomen is soft and nontender. Bladder is nonpalpable. No CVA tenderness. Neurologically he is grossly intact. No peripheral edema. Skin without gross abnormality. He has normal male external genitalia. Normal meatus. Bilateral testes are descended intrascrotally and normal to palpation. On rectal examination, there is normal sphincter tone. The prostate is clinically benign without focal induration or nodularity.\par \par His recent KUB X-ray demonstrated a focus of mineralization along the medial aspect of the left renal shadow measuring 0.7 cm, possibly representing a renal or ureteric stone. \par \par I personally reviewed renal ultrasound with the patient today. Images demonstrated moderate left hydronephrosis with an obstructing stone in the proximal ureter measuring up to 9.6 mm. There are nonobstructing right renal stones measuring up to 2.6 mm in the mid to lower pole. Both kidneys are normal in size and echogenicity without solid masses visualized. \par \par Assessment: Possible proteinuria. Left renal colic. Left hydronephrosis and ureteral stone.\par \par I counseled the patient. In terms of the his left ureteral stone and hydronephrosis, I discussed his treatment option of obtaining in-office left stent placement to relieve his left flank pain. I counseled the patient regarding the procedure. The risks and benefits were discussed. Alternatives were given. I answered the patient questions. The patient declined due to the lack of significant pain. He will repeat BMP today to ensure stability. The patient will follow-up as directed and will contact me with any questions or concerns. Thank you for the opportunity to participate in the care of Mr. SANCHEZ. I will keep you updated on his progress. \par \par Plan: BMP. Follow up as directed.

## 2020-05-08 DIAGNOSIS — N23 UNSPECIFIED RENAL COLIC: ICD-10-CM

## 2020-05-08 DIAGNOSIS — R10.32 LEFT LOWER QUADRANT PAIN: ICD-10-CM

## 2020-05-08 LAB
ANION GAP SERPL CALC-SCNC: 16 MMOL/L
BUN SERPL-MCNC: 15 MG/DL
CALCIUM SERPL-MCNC: 10 MG/DL
CHLORIDE SERPL-SCNC: 103 MMOL/L
CO2 SERPL-SCNC: 23 MMOL/L
CREAT SERPL-MCNC: 0.89 MG/DL
GLUCOSE SERPL-MCNC: 118 MG/DL
POTASSIUM SERPL-SCNC: 4.3 MMOL/L
SODIUM SERPL-SCNC: 142 MMOL/L

## 2020-07-10 ENCOUNTER — OUTPATIENT (OUTPATIENT)
Dept: OUTPATIENT SERVICES | Facility: HOSPITAL | Age: 50
LOS: 1 days | End: 2020-07-10
Payer: COMMERCIAL

## 2020-07-10 VITALS
SYSTOLIC BLOOD PRESSURE: 134 MMHG | HEIGHT: 66 IN | WEIGHT: 158.07 LBS | OXYGEN SATURATION: 97 % | HEART RATE: 69 BPM | RESPIRATION RATE: 14 BRPM | TEMPERATURE: 98 F | DIASTOLIC BLOOD PRESSURE: 93 MMHG

## 2020-07-10 DIAGNOSIS — R10.32 LEFT LOWER QUADRANT PAIN: ICD-10-CM

## 2020-07-10 DIAGNOSIS — N23 UNSPECIFIED RENAL COLIC: ICD-10-CM

## 2020-07-10 DIAGNOSIS — Z29.9 ENCOUNTER FOR PROPHYLACTIC MEASURES, UNSPECIFIED: ICD-10-CM

## 2020-07-10 DIAGNOSIS — Z01.818 ENCOUNTER FOR OTHER PREPROCEDURAL EXAMINATION: ICD-10-CM

## 2020-07-10 LAB
A1C WITH ESTIMATED AVERAGE GLUCOSE RESULT: 5.9 % — HIGH (ref 4–5.6)
ANION GAP SERPL CALC-SCNC: 15 MMOL/L — SIGNIFICANT CHANGE UP (ref 5–17)
BUN SERPL-MCNC: 13 MG/DL — SIGNIFICANT CHANGE UP (ref 7–23)
CALCIUM SERPL-MCNC: 9.4 MG/DL — SIGNIFICANT CHANGE UP (ref 8.4–10.5)
CHLORIDE SERPL-SCNC: 104 MMOL/L — SIGNIFICANT CHANGE UP (ref 96–108)
CO2 SERPL-SCNC: 20 MMOL/L — LOW (ref 22–31)
CREAT SERPL-MCNC: 0.86 MG/DL — SIGNIFICANT CHANGE UP (ref 0.5–1.3)
ESTIMATED AVERAGE GLUCOSE: 123 MG/DL — HIGH (ref 68–114)
GLUCOSE SERPL-MCNC: 109 MG/DL — HIGH (ref 70–99)
HCT VFR BLD CALC: 43.2 % — SIGNIFICANT CHANGE UP (ref 39–50)
HGB BLD-MCNC: 14.9 G/DL — SIGNIFICANT CHANGE UP (ref 13–17)
MCHC RBC-ENTMCNC: 31.5 PG — SIGNIFICANT CHANGE UP (ref 27–34)
MCHC RBC-ENTMCNC: 34.5 GM/DL — SIGNIFICANT CHANGE UP (ref 32–36)
MCV RBC AUTO: 91.3 FL — SIGNIFICANT CHANGE UP (ref 80–100)
NRBC # BLD: 0 /100 WBCS — SIGNIFICANT CHANGE UP (ref 0–0)
PLATELET # BLD AUTO: 212 K/UL — SIGNIFICANT CHANGE UP (ref 150–400)
POTASSIUM SERPL-MCNC: 4 MMOL/L — SIGNIFICANT CHANGE UP (ref 3.5–5.3)
POTASSIUM SERPL-SCNC: 4 MMOL/L — SIGNIFICANT CHANGE UP (ref 3.5–5.3)
RBC # BLD: 4.73 M/UL — SIGNIFICANT CHANGE UP (ref 4.2–5.8)
RBC # FLD: 12.2 % — SIGNIFICANT CHANGE UP (ref 10.3–14.5)
SODIUM SERPL-SCNC: 139 MMOL/L — SIGNIFICANT CHANGE UP (ref 135–145)
WBC # BLD: 5.05 K/UL — SIGNIFICANT CHANGE UP (ref 3.8–10.5)
WBC # FLD AUTO: 5.05 K/UL — SIGNIFICANT CHANGE UP (ref 3.8–10.5)

## 2020-07-10 RX ORDER — CEFAZOLIN SODIUM 1 G
2000 VIAL (EA) INJECTION ONCE
Refills: 0 | Status: DISCONTINUED | OUTPATIENT
Start: 2020-07-15 | End: 2020-07-30

## 2020-07-10 NOTE — H&P PST ADULT - NSICDXPROBLEM_GEN_ALL_CORE_FT
PROBLEM DIAGNOSES  Problem: Renal colic  Assessment and Plan:     Problem: Left lower quadrant pain  Assessment and Plan:     Problem: Need for prophylactic measure  Assessment and Plan: The Caprini score indicates this patient is at risk for a VTE event (score 3-5).  Most surgical patients in this group would benefit from pharmacologic prophylaxis.  The surgical team will determine the balance between VTE risk and bleeding risk. PROBLEM DIAGNOSES  Problem: Renal colic  Assessment and Plan: -Scheduled for Left ureteroscopy; laser litotripsy; stone extraction; stent placment 7/15/20 with Dr. Jenkins  -CBC, BMP, HgbA1C, and urine culture today  -preop instructions provided  -He was advised to stop metformin 7/14 in am  -COVID scheduled     Problem: Left lower quadrant pain  Assessment and Plan:     Problem: Need for prophylactic measure  Assessment and Plan: The Caprini score indicates this patient is at risk for a VTE event (score 3-5).  Most surgical patients in this group would benefit from pharmacologic prophylaxis.  The surgical team will determine the balance between VTE risk and bleeding risk. PROBLEM DIAGNOSES  Problem: Renal colic  Assessment and Plan: -Scheduled for Left ureteroscopy; laser litotripsy; stone extraction; stent placment 7/15/20 with Dr. Jenkins  -CBC, BMP, HgbA1C, and urine culture today  -preop instructions provided  -He was advised to stop metformin 7/14 in am  -COVID scheduled 7/12/20 @ [address]    Problem: Left lower quadrant pain  Assessment and Plan:     Problem: Need for prophylactic measure  Assessment and Plan: The Caprini score indicates this patient is at risk for a VTE event (score 3-5).  Most surgical patients in this group would benefit from pharmacologic prophylaxis.  The surgical team will determine the balance between VTE risk and bleeding risk.

## 2020-07-10 NOTE — H&P PST ADULT - ATTENDING COMMENTS
Patient with left ureteral calculus. Patient wishes to proceed with left ureteroscopy, laser lithotripsy, stone extraction, stent placement. Informed consent was obtained. Alternatives were given. Risks including but not limited to bleeding, infection, risk of anesthesia, pain, failure to cure, negative ureteroscopy, stone migration, need for future procedure, and injury to surrounding structures were discussed. Patient wishes to proceed with procedure.

## 2020-07-10 NOTE — H&P PST ADULT - RS GEN PE MLT RESP DETAILS PC
breath sounds equal/no rales/no rhonchi/no wheezes/good air movement/normal/respirations non-labored/airway patent/clear to auscultation bilaterally

## 2020-07-10 NOTE — H&P PST ADULT - HISTORY OF PRESENT ILLNESS
49M with PMHx of HTN, T2DM, and nephrolithiasis presents today for presurgical testing 49M with PMHx of T2DM, hyperlipidemia, and nephrolithiasis presents today for presurgical testing.  According to Dr. Jenkins's office note dated 5/7/20 he has a history of elevated uric acid levels and renal stones, presenting with left renal colic. Patient complains of intermittent left flank pain. He denies any hematuria, dysuria, or urinary incontinence. He denies any fever or chills.  His recent KUB X-ray demonstrated a focus of mineralization along the medial aspect of the left renal shadow measuring 0.7 cm, possibly representing a renal or ureteric stone.  He is scheduled for left ureteroscopy; laser lithotripsy, stone extraction, and stent placement with Dr. Porfirio Jenkins on 7/15/20.     COVID scheduled for   PCP        I personally reviewed renal ultrasound with the patient today. Images demonstrated moderate left hydronephrosis with an obstructing stone in the proximal ureter measuring up to 9.6 mm. There are nonobstructing right renal stones measuring up to 2.6 mm in the mid to lower pole. Both kidneys are normal in size and echogenicity without solid masses visualized. 49M with PMHx of T2DM, hyperlipidemia, and nephrolithiasis presents today for presurgical testing.  According to Dr. Jenkins's office note dated 5/7/20 he has a history of elevated uric acid levels and renal stones, presenting with left renal colic. Patient complains of intermittent left flank pain. He denies any hematuria, dysuria, or urinary incontinence. He denies any fever or chills.  His recent KUB X-ray demonstrated a focus of mineralization along the medial aspect of the left renal shadow measuring 0.7 cm, possibly representing a renal or ureteric stone.   According to Dr. Jenkins's office note dated 5/7/20 he personally reviewed the renal ultrasound which demonstrated moderate left hydronephrosis with an obstructing stone in the proximal ureter measuring up to 9.6 mm.  There are nonobstructing right renal stones measuring up to 2.6 mm in the mid to lower pole. Both kidneys are normal in size and echogenicity without solid masses visualized.  He is scheduled for left ureteroscopy; laser lithotripsy, stone extraction, and stent placement with Dr. Porfirio Jenkins on 7/15/20.     COVID scheduled for 49M with PMHx of T2DM, hyperlipidemia, and nephrolithiasis presents today for presurgical testing.  According to Dr. Jenkins's office note dated 5/7/20 he has a history of elevated uric acid levels and renal stones, presenting with left renal colic. Patient complains of intermittent left flank pain. He denies any hematuria, dysuria, or urinary incontinence. He denies any fever or chills.  His recent KUB X-ray demonstrated a focus of mineralization along the medial aspect of the left renal shadow measuring 0.7 cm, possibly representing a renal or ureteric stone.   According to Dr. Jenkins's office note dated 5/7/20 he personally reviewed the renal ultrasound which demonstrated moderate left hydronephrosis with an obstructing stone in the proximal ureter measuring up to 9.6 mm.  There are nonobstructing right renal stones measuring up to 2.6 mm in the mid to lower pole. Both kidneys are normal in size and echogenicity without solid masses visualized.  He is scheduled for left ureteroscopy; laser lithotripsy, stone extraction, and stent placement with Dr. Porfirio Jenkins on 7/15/20.     COVID scheduled for 7/12/20 @ [address]

## 2020-07-10 NOTE — H&P PST ADULT - ASSESSMENT
CAPRINI SCORE [CLOT]    AGE RELATED RISK FACTORS                                                       MOBILITY RELATED FACTORS  [X] Age 41-60 years                                            (1 Point)                  [ ] Bed rest                                                        (1 Point)  [ ] Age: 61-74 years                                           (2 Points)                 [ ] Plaster cast                                                   (2 Points)  [ ] Age= 75 years                                              (3 Points)                 [ ] Bed bound for more than 72 hours                 (2 Points)    DISEASE RELATED RISK FACTORS                                               GENDER SPECIFIC FACTORS  [ ] Edema in the lower extremities                       (1 Point)                  [ ] Pregnancy                                                     (1 Point)  [ ] Varicose veins                                               (1 Point)                  [ ] Post-partum < 6 weeks                                   (1 Point)             [ ] BMI > 25 Kg/m2                                            (1 Point)                  [ ] Hormonal therapy  or oral contraception          (1 Point)                 [ ] Sepsis (in the previous month)                        (1 Point)                  [ ] History of pregnancy complications                 (1 point)  [ ] Pneumonia or serious lung disease                                               [ ] Unexplained or recurrent                     (1 Point)           (in the previous month)                               (1 Point)  [ ] Abnormal pulmonary function test                     (1 Point)                 SURGERY RELATED RISK FACTORS  [ ] Acute myocardial infarction                              (1 Point)                 [ ]  Section                                             (1 Point)  [ ] Congestive heart failure (in the previous month)  (1 Point)               [ ] Minor surgery                                                  (1 Point)   [ ] Inflammatory bowel disease                             (1 Point)                 [ ] Arthroscopic surgery                                        (2 Points)  [ ] Central venous access                                      (2 Points)                [X] General surgery lasting more than 45 minutes   (2 Points)       [ ] Stroke (in the previous month)                          (5 Points)               [ ] Elective arthroplasty                                         (5 Points)                                                                                                                                               HEMATOLOGY RELATED FACTORS                                                 TRAUMA RELATED RISK FACTORS  [ ] Prior episodes of VTE                                     (3 Points)                [ ] Fracture of the hip, pelvis, or leg                       (5 Points)  [ ] Positive family history for VTE                         (3 Points)                 [ ] Acute spinal cord injury (in the previous month)  (5 Points)  [ ] Prothrombin 48064 A                                     (3 Points)                 [ ] Paralysis  (less than 1 month)                             (5 Points)  [ ] Factor V Leiden                                             (3 Points)                  [ ] Multiple Trauma within 1 month                        (5 Points)  [ ] Lupus anticoagulants                                     (3 Points)                                                           [ ] Anticardiolipin antibodies                               (3 Points)                                                       [ ] High homocysteine in the blood                      (3 Points)                                             [ ] Other congenital or acquired thrombophilia      (3 Points)                                                [ ] Heparin induced thrombocytopenia                  (3 Points)                                          Total Score [  3  ] CAPRINI SCORE [CLOT]    AGE RELATED RISK FACTORS                                                       MOBILITY RELATED FACTORS  [X] Age 41-60 years                                            (1 Point)                  [ ] Bed rest                                                        (1 Point)  [ ] Age: 61-74 years                                           (2 Points)                 [ ] Plaster cast                                                   (2 Points)  [ ] Age= 75 years                                              (3 Points)                 [ ] Bed bound for more than 72 hours                 (2 Points)    DISEASE RELATED RISK FACTORS                                               GENDER SPECIFIC FACTORS  [ ] Edema in the lower extremities                       (1 Point)                  [ ] Pregnancy                                                     (1 Point)  [ ] Varicose veins                                               (1 Point)                  [ ] Post-partum < 6 weeks                                   (1 Point)             [X] BMI > 25 Kg/m2                                            (1 Point)                  [ ] Hormonal therapy  or oral contraception          (1 Point)                 [ ] Sepsis (in the previous month)                        (1 Point)                  [ ] History of pregnancy complications                 (1 point)  [ ] Pneumonia or serious lung disease                                               [ ] Unexplained or recurrent                     (1 Point)           (in the previous month)                               (1 Point)  [ ] Abnormal pulmonary function test                     (1 Point)                 SURGERY RELATED RISK FACTORS  [ ] Acute myocardial infarction                              (1 Point)                 [ ]  Section                                             (1 Point)  [ ] Congestive heart failure (in the previous month)  (1 Point)               [ ] Minor surgery                                                  (1 Point)   [ ] Inflammatory bowel disease                             (1 Point)                 [ ] Arthroscopic surgery                                        (2 Points)  [ ] Central venous access                                      (2 Points)                [X] General surgery lasting more than 45 minutes   (2 Points)       [ ] Stroke (in the previous month)                          (5 Points)               [ ] Elective arthroplasty                                         (5 Points)                                                                                                                                               HEMATOLOGY RELATED FACTORS                                                 TRAUMA RELATED RISK FACTORS  [ ] Prior episodes of VTE                                     (3 Points)                [ ] Fracture of the hip, pelvis, or leg                       (5 Points)  [ ] Positive family history for VTE                         (3 Points)                 [ ] Acute spinal cord injury (in the previous month)  (5 Points)  [ ] Prothrombin 80219 A                                     (3 Points)                 [ ] Paralysis  (less than 1 month)                             (5 Points)  [ ] Factor V Leiden                                             (3 Points)                  [ ] Multiple Trauma within 1 month                        (5 Points)  [ ] Lupus anticoagulants                                     (3 Points)                                                           [ ] Anticardiolipin antibodies                               (3 Points)                                                       [ ] High homocysteine in the blood                      (3 Points)                                             [ ] Other congenital or acquired thrombophilia      (3 Points)                                                [ ] Heparin induced thrombocytopenia                  (3 Points)                                          Total Score [  4  ]

## 2020-07-11 LAB
CULTURE RESULTS: SIGNIFICANT CHANGE UP
SPECIMEN SOURCE: SIGNIFICANT CHANGE UP

## 2020-07-12 ENCOUNTER — APPOINTMENT (OUTPATIENT)
Dept: DISASTER EMERGENCY | Facility: CLINIC | Age: 50
End: 2020-07-12

## 2020-07-14 ENCOUNTER — TRANSCRIPTION ENCOUNTER (OUTPATIENT)
Age: 50
End: 2020-07-14

## 2020-07-14 LAB — SARS-COV-2 N GENE NPH QL NAA+PROBE: NOT DETECTED

## 2020-07-15 ENCOUNTER — APPOINTMENT (OUTPATIENT)
Dept: UROLOGY | Facility: HOSPITAL | Age: 50
End: 2020-07-15

## 2020-07-15 ENCOUNTER — RESULT REVIEW (OUTPATIENT)
Age: 50
End: 2020-07-15

## 2020-07-15 ENCOUNTER — OUTPATIENT (OUTPATIENT)
Dept: OUTPATIENT SERVICES | Facility: HOSPITAL | Age: 50
LOS: 1 days | End: 2020-07-15
Payer: COMMERCIAL

## 2020-07-15 VITALS
SYSTOLIC BLOOD PRESSURE: 115 MMHG | HEART RATE: 72 BPM | OXYGEN SATURATION: 95 % | DIASTOLIC BLOOD PRESSURE: 79 MMHG | RESPIRATION RATE: 18 BRPM | TEMPERATURE: 98 F

## 2020-07-15 VITALS
TEMPERATURE: 98 F | WEIGHT: 158.07 LBS | HEIGHT: 66 IN | SYSTOLIC BLOOD PRESSURE: 142 MMHG | HEART RATE: 73 BPM | DIASTOLIC BLOOD PRESSURE: 89 MMHG | OXYGEN SATURATION: 98 % | RESPIRATION RATE: 18 BRPM

## 2020-07-15 DIAGNOSIS — N20.9 URINARY CALCULUS, UNSPECIFIED: ICD-10-CM

## 2020-07-15 DIAGNOSIS — N23 UNSPECIFIED RENAL COLIC: ICD-10-CM

## 2020-07-15 DIAGNOSIS — R10.32 LEFT LOWER QUADRANT PAIN: ICD-10-CM

## 2020-07-15 DIAGNOSIS — Z01.818 ENCOUNTER FOR OTHER PREPROCEDURAL EXAMINATION: ICD-10-CM

## 2020-07-15 LAB
GLUCOSE BLDC GLUCOMTR-MCNC: 111 MG/DL — HIGH (ref 70–99)
GLUCOSE BLDC GLUCOMTR-MCNC: 118 MG/DL — HIGH (ref 70–99)

## 2020-07-15 PROCEDURE — 52356 CYSTO/URETERO W/LITHOTRIPSY: CPT | Mod: LT

## 2020-07-15 PROCEDURE — 76000 FLUOROSCOPY <1 HR PHYS/QHP: CPT

## 2020-07-15 PROCEDURE — 87086 URINE CULTURE/COLONY COUNT: CPT

## 2020-07-15 PROCEDURE — 80048 BASIC METABOLIC PNL TOTAL CA: CPT

## 2020-07-15 PROCEDURE — G0463: CPT

## 2020-07-15 PROCEDURE — 83036 HEMOGLOBIN GLYCOSYLATED A1C: CPT

## 2020-07-15 PROCEDURE — 88300 SURGICAL PATH GROSS: CPT | Mod: 26

## 2020-07-15 PROCEDURE — 85027 COMPLETE CBC AUTOMATED: CPT

## 2020-07-15 PROCEDURE — 74420 UROGRAPHY RTRGR +-KUB: CPT | Mod: 26

## 2020-07-15 RX ORDER — LIDOCAINE HCL 20 MG/ML
0.2 VIAL (ML) INJECTION ONCE
Refills: 0 | Status: DISCONTINUED | OUTPATIENT
Start: 2020-07-15 | End: 2020-07-15

## 2020-07-15 RX ORDER — ACETAMINOPHEN 500 MG
1000 TABLET ORAL ONCE
Refills: 0 | Status: COMPLETED | OUTPATIENT
Start: 2020-07-15 | End: 2020-07-15

## 2020-07-15 RX ORDER — SODIUM CHLORIDE 9 MG/ML
3 INJECTION INTRAMUSCULAR; INTRAVENOUS; SUBCUTANEOUS EVERY 8 HOURS
Refills: 0 | Status: DISCONTINUED | OUTPATIENT
Start: 2020-07-15 | End: 2020-07-15

## 2020-07-15 RX ORDER — ATORVASTATIN CALCIUM 80 MG/1
1 TABLET, FILM COATED ORAL
Qty: 0 | Refills: 0 | DISCHARGE

## 2020-07-15 RX ORDER — KETOROLAC TROMETHAMINE 30 MG/ML
30 SYRINGE (ML) INJECTION ONCE
Refills: 0 | Status: DISCONTINUED | OUTPATIENT
Start: 2020-07-15 | End: 2020-07-15

## 2020-07-15 RX ORDER — CEPHALEXIN 500 MG
1 CAPSULE ORAL
Qty: 9 | Refills: 0
Start: 2020-07-15 | End: 2020-07-17

## 2020-07-15 RX ORDER — METFORMIN HYDROCHLORIDE 850 MG/1
1 TABLET ORAL
Qty: 0 | Refills: 0 | DISCHARGE

## 2020-07-15 RX ORDER — FENTANYL CITRATE 50 UG/ML
50 INJECTION INTRAVENOUS
Refills: 0 | Status: DISCONTINUED | OUTPATIENT
Start: 2020-07-15 | End: 2020-07-15

## 2020-07-15 RX ADMIN — Medication 1000 MILLIGRAM(S): at 18:45

## 2020-07-15 RX ADMIN — Medication 400 MILLIGRAM(S): at 18:07

## 2020-07-15 NOTE — ASU DISCHARGE PLAN (ADULT/PEDIATRIC) - CARE PROVIDER_API CALL
Porfirio Jenkins  UROLOGY  [address] [place] [address]  Phone: [phone]  Fax: [phone]  Follow Up Time:

## 2020-07-15 NOTE — PRE-ANESTHESIA EVALUATION ADULT - NSANTHRISKNONERD_GEN_ALL_CORE
No risk alerts present

## 2020-07-15 NOTE — ASU DISCHARGE PLAN (ADULT/PEDIATRIC) - ASU DC SPECIAL INSTRUCTIONSFT
You may take up to 650mg of tylenol every 6 hours for pain.  You can alternate this with ibuprofen 400mg every 6 hours.  Do not take more than 4000mg of tylenol or 2400mg of ibuprofen in one day.    We have sent 3 days keflex to your pharmacy.    Please f/u with Dr. Pizarro in 1-2 weeks.

## 2020-07-16 PROCEDURE — C2617: CPT

## 2020-07-16 PROCEDURE — C1758: CPT

## 2020-07-16 PROCEDURE — 82365 CALCULUS SPECTROSCOPY: CPT

## 2020-07-16 PROCEDURE — C1889: CPT

## 2020-07-16 PROCEDURE — 88300 SURGICAL PATH GROSS: CPT

## 2020-07-16 PROCEDURE — C1769: CPT

## 2020-07-16 PROCEDURE — 52356 CYSTO/URETERO W/LITHOTRIPSY: CPT | Mod: LT

## 2020-07-16 PROCEDURE — 82962 GLUCOSE BLOOD TEST: CPT

## 2020-07-20 LAB — SURGICAL PATHOLOGY STUDY: SIGNIFICANT CHANGE UP

## 2020-07-23 LAB — NIDUS STONE QN: SIGNIFICANT CHANGE UP

## 2020-07-27 ENCOUNTER — APPOINTMENT (OUTPATIENT)
Dept: UROLOGY | Facility: CLINIC | Age: 50
End: 2020-07-27

## 2020-07-27 ENCOUNTER — OUTPATIENT (OUTPATIENT)
Dept: OUTPATIENT SERVICES | Facility: HOSPITAL | Age: 50
LOS: 1 days | End: 2020-07-27
Payer: COMMERCIAL

## 2020-07-27 ENCOUNTER — APPOINTMENT (OUTPATIENT)
Dept: UROLOGY | Facility: CLINIC | Age: 50
End: 2020-07-27
Payer: MEDICAID

## 2020-07-27 VITALS — SYSTOLIC BLOOD PRESSURE: 117 MMHG | DIASTOLIC BLOOD PRESSURE: 75 MMHG | HEART RATE: 75 BPM

## 2020-07-27 VITALS — TEMPERATURE: 98.1 F

## 2020-07-27 DIAGNOSIS — R35.0 FREQUENCY OF MICTURITION: ICD-10-CM

## 2020-07-27 DIAGNOSIS — N23 UNSPECIFIED RENAL COLIC: ICD-10-CM

## 2020-07-27 DIAGNOSIS — Z01.818 ENCOUNTER FOR OTHER PREPROCEDURAL EXAMINATION: ICD-10-CM

## 2020-07-27 DIAGNOSIS — R10.32 LEFT LOWER QUADRANT PAIN: ICD-10-CM

## 2020-07-27 DIAGNOSIS — Z00.00 ENCOUNTER FOR GENERAL ADULT MEDICAL EXAMINATION W/OUT ABNORMAL FINDINGS: ICD-10-CM

## 2020-07-27 PROCEDURE — 52310 CYSTOSCOPY AND TREATMENT: CPT

## 2020-07-27 RX ORDER — OXYCODONE AND ACETAMINOPHEN 5; 325 MG/1; MG/1
5-325 TABLET ORAL
Qty: 6 | Refills: 0 | Status: ACTIVE | COMMUNITY
Start: 2020-07-27 | End: 1900-01-01

## 2020-07-29 LAB
APPEARANCE: CLEAR
BACTERIA UR CULT: NORMAL
BACTERIA: NEGATIVE
BILIRUBIN URINE: NEGATIVE
BLOOD URINE: ABNORMAL
COLOR: COLORLESS
GLUCOSE QUALITATIVE U: NEGATIVE
HYALINE CASTS: 0 /LPF
KETONES URINE: NEGATIVE
LEUKOCYTE ESTERASE URINE: NEGATIVE
MICROSCOPIC-UA: NORMAL
NITRITE URINE: NEGATIVE
PH URINE: 6.5
PROTEIN URINE: NORMAL
RED BLOOD CELLS URINE: 33 /HPF
SPECIFIC GRAVITY URINE: 1.01
SQUAMOUS EPITHELIAL CELLS: 1 /HPF
UROBILINOGEN URINE: NORMAL
WHITE BLOOD CELLS URINE: 2 /HPF

## 2020-07-30 DIAGNOSIS — Z01.818 ENCOUNTER FOR OTHER PREPROCEDURAL EXAMINATION: ICD-10-CM

## 2020-07-30 DIAGNOSIS — R10.32 LEFT LOWER QUADRANT PAIN: ICD-10-CM

## 2020-07-30 DIAGNOSIS — N23 UNSPECIFIED RENAL COLIC: ICD-10-CM

## 2020-08-24 ENCOUNTER — APPOINTMENT (OUTPATIENT)
Dept: UROLOGY | Facility: CLINIC | Age: 50
End: 2020-08-24

## 2020-08-24 VITALS — TEMPERATURE: 98.1 F

## 2020-08-24 NOTE — LETTER BODY
[FreeTextEntry1] : Elliot Avendano MD\par [address]\[place] [address]\par \par Dear Dr. Avendano,\par \par Reason for visit: History of renal stones. Left ureteral stone.\par \par This is a 50 year-old Mandarin-speaking gentleman with a history of elevated uric acid levels and renal stones, presenting with a left ureteral stone s/p ureteroscopy and stone extraction in July. Patient returns today for renal ultrasound. However, patient is unable to obtain renal ultrasound today due to insurance pre-authorization requirements. He denies any changes in health. He denies any pain. The past medical history, family history and social history are unchanged. All other review of systems are negative. Patient denies any changes in medications. Medication list was reconciled. \par \par On examination, the patient is a healthy-appearing gentleman in no acute distress. He is alert and oriented follows commands. He has normal mood and affect. He is normocephalic. Neck is supple. Respirations are unlabored. His abdomen is soft and nontender. Bladder is nonpalpable. No CVA tenderness. Neurologically he is grossly intact. No peripheral edema. Skin without gross abnormality. He has normal male external genitalia. Normal meatus. Bilateral testes are descended intrascrotally and normal to palpation. On rectal examination, there is normal sphincter tone. The prostate is clinically benign without focal induration or nodularity.\par \par Assessment: Left hydronephrosis and ureteral stone s/p left ureteroscopy.\par \par I counseled the patient. He was unable to obtain a renal ultrasound today due to insurance pre-authorization requirements. He may obtain a renal ultrasound when he is next available. The risks and benefits were discussed. Alternatives were given. I answered the patient questions.The patient will follow-up as directed and will contact me with any questions or concerns. Thank you for the opportunity to participate in the care of Mr. SANCHEZ. I will keep you updated on his progress. \par \par Plan: Renal ultrasound. Follow up as directed.

## 2020-09-03 ENCOUNTER — OUTPATIENT (OUTPATIENT)
Dept: OUTPATIENT SERVICES | Facility: HOSPITAL | Age: 50
LOS: 1 days | End: 2020-09-03
Payer: COMMERCIAL

## 2020-09-03 ENCOUNTER — APPOINTMENT (OUTPATIENT)
Dept: UROLOGY | Facility: CLINIC | Age: 50
End: 2020-09-03
Payer: MEDICAID

## 2020-09-03 VITALS — TEMPERATURE: 97.9 F

## 2020-09-03 DIAGNOSIS — R35.0 FREQUENCY OF MICTURITION: ICD-10-CM

## 2020-09-03 PROCEDURE — 76775 US EXAM ABDO BACK WALL LIM: CPT | Mod: 26

## 2020-09-03 PROCEDURE — 76775 US EXAM ABDO BACK WALL LIM: CPT

## 2020-09-03 PROCEDURE — 99213 OFFICE O/P EST LOW 20 MIN: CPT | Mod: 25

## 2020-09-03 NOTE — LETTER BODY
[FreeTextEntry1] : Elliot Avendano MD\par [address]\[place] [address]\par \par Dear Dr. Avendano,\par \par Reason for visit: History of renal stones. Left ureteral stone.\par \par This is a 50 year-old Mandarin-speaking gentleman with a history of elevated uric acid levels and renal stones, presenting with a left ureteral stone s/p ureteroscopy and stone extraction in July. Patient returns today for follow-up. Since he was last seen, he has been well. He denies any flank pain or hematuria. He denies any changes in health. The past medical history, family history and social history are unchanged. All other review of systems are negative. Patient denies any changes in medications. Medication list was reconciled. \par \par On examination, the patient is a healthy-appearing gentleman in no acute distress. He is alert and oriented follows commands. He has normal mood and affect. He is normocephalic. Neck is supple. Respirations are unlabored. His abdomen is soft and nontender. Bladder is nonpalpable. No CVA tenderness. Neurologically he is grossly intact. No peripheral edema. Skin without gross abnormality. He has normal male external genitalia. Normal meatus. Bilateral testes are descended intrascrotally and normal to palpation. On rectal examination, there is normal sphincter tone. The prostate is clinically benign without focal induration or nodularity.\par \par I personally reviewed ultrasound images with the patient today. Images demonstrated a 3 mm nonobstructing stone in the lower pole of the right kidney. Both kidneys are normal in size and echogenicity without hydronephrosis or solid masses visualized. \par \par Assessment: Left hydronephrosis and ureteral stone s/p left ureteroscopy.\par \par I counseled the patient. His ultrasound today demonstrated small right renal fragments. I encouraged patient to maintain a low-protein low-sodium diet. I also encouraged hydration to facilitate stone passage. I recommend he follow up in 6 months to repeat renal ultrasound and ensure stability. The risks and benefits were discussed. Alternatives were given. I answered the patient questions.The patient will follow-up as directed and will contact me with any questions or concerns. Thank you for the opportunity to participate in the care of Mr. SANCHEZ. I will keep you updated on his progress. \par \par Plan: Follow up 6 months

## 2020-09-12 DIAGNOSIS — N23 UNSPECIFIED RENAL COLIC: ICD-10-CM

## 2023-08-08 NOTE — ED STATDOCS - CHPI ED SYMPTOM NEG
Report called and given to Georgette at [facility]. Will send packet with patient.    no diarrhea/no back pain

## 2023-09-10 ENCOUNTER — EMERGENCY (EMERGENCY)
Facility: HOSPITAL | Age: 53
LOS: 1 days | Discharge: HOME | End: 2023-09-10
Attending: EMERGENCY MEDICINE
Payer: MEDICAID

## 2023-09-10 VITALS
DIASTOLIC BLOOD PRESSURE: 100 MMHG | HEIGHT: 67 IN | TEMPERATURE: 98 F | SYSTOLIC BLOOD PRESSURE: 152 MMHG | RESPIRATION RATE: 18 BRPM | OXYGEN SATURATION: 99 % | WEIGHT: 145.95 LBS | HEART RATE: 65 BPM

## 2023-09-10 LAB
ALBUMIN SERPL ELPH-MCNC: 4.3 G/DL — SIGNIFICANT CHANGE UP (ref 3.3–5)
ALP SERPL-CCNC: 92 U/L — SIGNIFICANT CHANGE UP (ref 40–120)
ALT FLD-CCNC: 45 U/L — SIGNIFICANT CHANGE UP (ref 10–45)
ANION GAP SERPL CALC-SCNC: 10 MMOL/L — SIGNIFICANT CHANGE UP (ref 5–17)
AST SERPL-CCNC: 24 U/L — SIGNIFICANT CHANGE UP (ref 10–40)
BASOPHILS # BLD AUTO: 0.03 K/UL — SIGNIFICANT CHANGE UP (ref 0–0.2)
BASOPHILS NFR BLD AUTO: 0.7 % — SIGNIFICANT CHANGE UP (ref 0–2)
BILIRUB SERPL-MCNC: 0.5 MG/DL — SIGNIFICANT CHANGE UP (ref 0.2–1.2)
BUN SERPL-MCNC: 10 MG/DL — SIGNIFICANT CHANGE UP (ref 7–23)
CALCIUM SERPL-MCNC: 9.2 MG/DL — SIGNIFICANT CHANGE UP (ref 8.4–10.5)
CHLORIDE SERPL-SCNC: 109 MMOL/L — HIGH (ref 96–108)
CO2 SERPL-SCNC: 23 MMOL/L — SIGNIFICANT CHANGE UP (ref 22–31)
CREAT SERPL-MCNC: 0.65 MG/DL — SIGNIFICANT CHANGE UP (ref 0.5–1.3)
EGFR: 111 ML/MIN/1.73M2 — SIGNIFICANT CHANGE UP
EOSINOPHIL # BLD AUTO: 0.08 K/UL — SIGNIFICANT CHANGE UP (ref 0–0.5)
EOSINOPHIL NFR BLD AUTO: 1.9 % — SIGNIFICANT CHANGE UP (ref 0–6)
GLUCOSE SERPL-MCNC: 110 MG/DL — HIGH (ref 70–99)
HCT VFR BLD CALC: 39.2 % — SIGNIFICANT CHANGE UP (ref 39–50)
HGB BLD-MCNC: 13.8 G/DL — SIGNIFICANT CHANGE UP (ref 13–17)
IMM GRANULOCYTES NFR BLD AUTO: 0.5 % — SIGNIFICANT CHANGE UP (ref 0–0.9)
LYMPHOCYTES # BLD AUTO: 1.34 K/UL — SIGNIFICANT CHANGE UP (ref 1–3.3)
LYMPHOCYTES # BLD AUTO: 31.8 % — SIGNIFICANT CHANGE UP (ref 13–44)
MCHC RBC-ENTMCNC: 31.9 PG — SIGNIFICANT CHANGE UP (ref 27–34)
MCHC RBC-ENTMCNC: 35.2 GM/DL — SIGNIFICANT CHANGE UP (ref 32–36)
MCV RBC AUTO: 90.7 FL — SIGNIFICANT CHANGE UP (ref 80–100)
MONOCYTES # BLD AUTO: 0.39 K/UL — SIGNIFICANT CHANGE UP (ref 0–0.9)
MONOCYTES NFR BLD AUTO: 9.3 % — SIGNIFICANT CHANGE UP (ref 2–14)
NEUTROPHILS # BLD AUTO: 2.35 K/UL — SIGNIFICANT CHANGE UP (ref 1.8–7.4)
NEUTROPHILS NFR BLD AUTO: 55.8 % — SIGNIFICANT CHANGE UP (ref 43–77)
NRBC # BLD: 0 /100 WBCS — SIGNIFICANT CHANGE UP (ref 0–0)
PLATELET # BLD AUTO: 175 K/UL — SIGNIFICANT CHANGE UP (ref 150–400)
POTASSIUM SERPL-MCNC: 4.2 MMOL/L — SIGNIFICANT CHANGE UP (ref 3.5–5.3)
POTASSIUM SERPL-SCNC: 4.2 MMOL/L — SIGNIFICANT CHANGE UP (ref 3.5–5.3)
PROT SERPL-MCNC: 7.1 G/DL — SIGNIFICANT CHANGE UP (ref 6–8.3)
RBC # BLD: 4.32 M/UL — SIGNIFICANT CHANGE UP (ref 4.2–5.8)
RBC # FLD: 12.7 % — SIGNIFICANT CHANGE UP (ref 10.3–14.5)
SODIUM SERPL-SCNC: 142 MMOL/L — SIGNIFICANT CHANGE UP (ref 135–145)
WBC # BLD: 4.21 K/UL — SIGNIFICANT CHANGE UP (ref 3.8–10.5)
WBC # FLD AUTO: 4.21 K/UL — SIGNIFICANT CHANGE UP (ref 3.8–10.5)

## 2023-09-10 PROCEDURE — 99283 EMERGENCY DEPT VISIT LOW MDM: CPT

## 2023-09-10 PROCEDURE — 80053 COMPREHEN METABOLIC PANEL: CPT

## 2023-09-10 PROCEDURE — 85025 COMPLETE CBC W/AUTO DIFF WBC: CPT

## 2023-09-10 NOTE — ED ADULT TRIAGE NOTE - CHIEF COMPLAINT QUOTE
4 days of swollen/painful lymph nodes in neck, bilateral underarms, groin  + R shoulder and R flank pain since this morning - needle like pain

## 2023-09-10 NOTE — ED PROVIDER NOTE - PATIENT PORTAL LINK FT
You can access the FollowMyHealth Patient Portal offered by [facility] by registering at the following website: [email or web address]. By joining [facility]’s FollowMyHealth portal, you will also be able to view your health information using other applications (apps) compatible with our system.

## 2023-09-10 NOTE — ED ADULT NURSE NOTE - OBJECTIVE STATEMENT
Patient is a 53y male presenting to the ED ambulatory from home with c/o body aches. Patient A&Ox4. PMH of HTN, DM2, recent thyroid biopsy 6/23. Patient is predominantly Mandarin speaking but also speaks English, denies needing a . Reports intermittent pain in the right side of the neck since the left sided biopsy but states pain has worsened and become more frequent this week. Also reports intermittent pain in bilateral axillae and bilateral sides of the groin. Denies any fever/chills, chest pain, SOB, abdominal pain, N/V/D, burning upon urination or difficulty urinating. Respirations spontaneous, even, and non-labored. Abdomen soft, non-distended and non-tender upon palpation.

## 2023-09-10 NOTE — ED PROVIDER NOTE - PROGRESS NOTE DETAILS
Elpidio yarbrough PA-C: labs reviewed. no acute pathology noted.  used ID number [id number]. discussed results with patient and importance of following up with pcp. in agreement with plan. well appearing. stable for discharge. discussed with Dr. Hidalgo.

## 2023-09-10 NOTE — ED PROVIDER NOTE - NSFOLLOWUPINSTRUCTIONS_ED_ALL_ED_FT
1. It is important to follow up with your primary care doctor in 1-2 days    2. bring a copy of all your results to your follow up appointments    3. you can take Tylenol as needed for pain. you can take 650mg of Tylenol every 6 hours as needed for pain. do not take more than 4000mg in a 24 hour period.     4. if your symptoms worsen, persist, or if any new symptoms develop, or if you experience any signs of distress, return to the ER right away.

## 2023-09-10 NOTE — ED PROVIDER NOTE - OBJECTIVE STATEMENT
54 y/o male, mandarin and english speaking, refused , PMHx of HTN, T2DM, and nephrolithiasis, recent thyroid biopsy 6/23, with no acute pathology found, presents to the ER for right sided pain to neck since biopsy. [facility] pain was initially intermittent since June, but over past few days has been experiencing worsening pain to right side of neck. [facility] biopsy was done on the left side. [facility] at times also has pain under b/l axilla as well as to groin. [facility] does not have the pain at this time. denies f/n/v/d, CP, SOB, Ha, dizziness, abdominal pain, urinary symptoms, swelling. of note, [facility] saw ent last week and was told everything looked good.

## 2023-09-10 NOTE — ED PROVIDER NOTE - ATTENDING APP SHARED VISIT CONTRIBUTION OF CARE
Hx: pt and wife, 1+ month of intermittent pain anterior sides of neck, L armpit, and L groin.  No fever.      PE: well appearing, nontoxic, no respiratory distress, nontender throughout neck, armpit, groin, abdomen.  No appreciable lymphadenopathy of these regions either.    MDM: nonspecific regional pain without clinical exam findings, check cbc r/o anemia or leukocytosis, check bmp to r/o metabolic derangement and lyte imbalance, outpatient workup.

## 2023-09-10 NOTE — ED PROVIDER NOTE - NS ED ATTENDING STATEMENT MOD
This was a shared visit with the JAZMYN. I reviewed and verified the documentation and independently performed the documented:

## 2023-09-10 NOTE — ED ADULT NURSE NOTE - NSFALLUNIVINTERV_ED_ALL_ED
Bed/Stretcher in lowest position, wheels locked, appropriate side rails in place/Call bell, personal items and telephone in reach/Instruct patient to call for assistance before getting out of bed/chair/stretcher/Non-slip footwear applied when patient is off stretcher/[place] to call system/Physically safe environment - no spills, clutter or unnecessary equipment/Purposeful proactive rounding/Room/bathroom lighting operational, light cord in reach

## 2023-09-23 ENCOUNTER — EMERGENCY (EMERGENCY)
Facility: HOSPITAL | Age: 53
LOS: 1 days | Discharge: HOME | End: 2023-09-23
Attending: EMERGENCY MEDICINE
Payer: MEDICAID

## 2023-09-23 VITALS
RESPIRATION RATE: 16 BRPM | SYSTOLIC BLOOD PRESSURE: 145 MMHG | HEIGHT: 67 IN | OXYGEN SATURATION: 96 % | HEART RATE: 79 BPM | DIASTOLIC BLOOD PRESSURE: 88 MMHG | TEMPERATURE: 98 F | WEIGHT: 145.95 LBS

## 2023-09-23 LAB
ALBUMIN SERPL ELPH-MCNC: 4.8 G/DL — SIGNIFICANT CHANGE UP (ref 3.3–5)
ALP SERPL-CCNC: 119 U/L — SIGNIFICANT CHANGE UP (ref 40–120)
ALT FLD-CCNC: 50 U/L — HIGH (ref 10–45)
ANION GAP SERPL CALC-SCNC: 12 MMOL/L — SIGNIFICANT CHANGE UP (ref 5–17)
APTT BLD: 25.3 SEC — SIGNIFICANT CHANGE UP (ref 24.5–35.6)
AST SERPL-CCNC: 23 U/L — SIGNIFICANT CHANGE UP (ref 10–40)
BASE EXCESS BLDV CALC-SCNC: 2.4 MMOL/L — SIGNIFICANT CHANGE UP (ref -2–3)
BASOPHILS # BLD AUTO: 0.02 K/UL — SIGNIFICANT CHANGE UP (ref 0–0.2)
BASOPHILS NFR BLD AUTO: 0.4 % — SIGNIFICANT CHANGE UP (ref 0–2)
BILIRUB SERPL-MCNC: 0.6 MG/DL — SIGNIFICANT CHANGE UP (ref 0.2–1.2)
BUN SERPL-MCNC: 14 MG/DL — SIGNIFICANT CHANGE UP (ref 7–23)
CA-I SERPL-SCNC: 1.19 MMOL/L — SIGNIFICANT CHANGE UP (ref 1.15–1.33)
CALCIUM SERPL-MCNC: 10 MG/DL — SIGNIFICANT CHANGE UP (ref 8.4–10.5)
CHLORIDE BLDV-SCNC: 104 MMOL/L — SIGNIFICANT CHANGE UP (ref 96–108)
CHLORIDE SERPL-SCNC: 103 MMOL/L — SIGNIFICANT CHANGE UP (ref 96–108)
CO2 BLDV-SCNC: 28 MMOL/L — HIGH (ref 22–26)
CO2 SERPL-SCNC: 25 MMOL/L — SIGNIFICANT CHANGE UP (ref 22–31)
CREAT SERPL-MCNC: 0.72 MG/DL — SIGNIFICANT CHANGE UP (ref 0.5–1.3)
EGFR: 108 ML/MIN/1.73M2 — SIGNIFICANT CHANGE UP
EOSINOPHIL # BLD AUTO: 0.06 K/UL — SIGNIFICANT CHANGE UP (ref 0–0.5)
EOSINOPHIL NFR BLD AUTO: 1.2 % — SIGNIFICANT CHANGE UP (ref 0–6)
GAS PNL BLDV: 135 MMOL/L — LOW (ref 136–145)
GAS PNL BLDV: SIGNIFICANT CHANGE UP
GAS PNL BLDV: SIGNIFICANT CHANGE UP
GLUCOSE BLDC GLUCOMTR-MCNC: 96 MG/DL — SIGNIFICANT CHANGE UP (ref 70–99)
GLUCOSE BLDV-MCNC: 92 MG/DL — SIGNIFICANT CHANGE UP (ref 70–99)
GLUCOSE SERPL-MCNC: 178 MG/DL — HIGH (ref 70–99)
HCO3 BLDV-SCNC: 27 MMOL/L — SIGNIFICANT CHANGE UP (ref 22–29)
HCT VFR BLD CALC: 46 % — SIGNIFICANT CHANGE UP (ref 39–50)
HCT VFR BLDA CALC: 45 % — SIGNIFICANT CHANGE UP (ref 39–51)
HGB BLD CALC-MCNC: 15.1 G/DL — SIGNIFICANT CHANGE UP (ref 12.6–17.4)
HGB BLD-MCNC: 16 G/DL — SIGNIFICANT CHANGE UP (ref 13–17)
IMM GRANULOCYTES NFR BLD AUTO: 0.8 % — SIGNIFICANT CHANGE UP (ref 0–0.9)
INR BLD: 1.01 RATIO — SIGNIFICANT CHANGE UP (ref 0.85–1.18)
LACTATE BLDV-MCNC: 1.2 MMOL/L — SIGNIFICANT CHANGE UP (ref 0.5–2)
LYMPHOCYTES # BLD AUTO: 1.05 K/UL — SIGNIFICANT CHANGE UP (ref 1–3.3)
LYMPHOCYTES # BLD AUTO: 20.8 % — SIGNIFICANT CHANGE UP (ref 13–44)
MCHC RBC-ENTMCNC: 31.8 PG — SIGNIFICANT CHANGE UP (ref 27–34)
MCHC RBC-ENTMCNC: 34.8 GM/DL — SIGNIFICANT CHANGE UP (ref 32–36)
MCV RBC AUTO: 91.5 FL — SIGNIFICANT CHANGE UP (ref 80–100)
MONOCYTES # BLD AUTO: 0.43 K/UL — SIGNIFICANT CHANGE UP (ref 0–0.9)
MONOCYTES NFR BLD AUTO: 8.5 % — SIGNIFICANT CHANGE UP (ref 2–14)
NEUTROPHILS # BLD AUTO: 3.44 K/UL — SIGNIFICANT CHANGE UP (ref 1.8–7.4)
NEUTROPHILS NFR BLD AUTO: 68.3 % — SIGNIFICANT CHANGE UP (ref 43–77)
NRBC # BLD: 0 /100 WBCS — SIGNIFICANT CHANGE UP (ref 0–0)
PCO2 BLDV: 42 MMHG — SIGNIFICANT CHANGE UP (ref 42–55)
PH BLDV: 7.42 — SIGNIFICANT CHANGE UP (ref 7.32–7.43)
PLATELET # BLD AUTO: 230 K/UL — SIGNIFICANT CHANGE UP (ref 150–400)
PO2 BLDV: 45 MMHG — SIGNIFICANT CHANGE UP (ref 25–45)
POTASSIUM BLDV-SCNC: 3.8 MMOL/L — SIGNIFICANT CHANGE UP (ref 3.5–5.1)
POTASSIUM SERPL-MCNC: 3.9 MMOL/L — SIGNIFICANT CHANGE UP (ref 3.5–5.3)
POTASSIUM SERPL-SCNC: 3.9 MMOL/L — SIGNIFICANT CHANGE UP (ref 3.5–5.3)
PROT SERPL-MCNC: 8.2 G/DL — SIGNIFICANT CHANGE UP (ref 6–8.3)
PROTHROM AB SERPL-ACNC: 11.1 SEC — SIGNIFICANT CHANGE UP (ref 9.5–13)
RBC # BLD: 5.03 M/UL — SIGNIFICANT CHANGE UP (ref 4.2–5.8)
RBC # FLD: 12.5 % — SIGNIFICANT CHANGE UP (ref 10.3–14.5)
SAO2 % BLDV: 77.1 % — SIGNIFICANT CHANGE UP (ref 67–88)
SODIUM SERPL-SCNC: 140 MMOL/L — SIGNIFICANT CHANGE UP (ref 135–145)
TROPONIN T, HIGH SENSITIVITY RESULT: 8 NG/L — SIGNIFICANT CHANGE UP (ref 0–51)
WBC # BLD: 5.04 K/UL — SIGNIFICANT CHANGE UP (ref 3.8–10.5)
WBC # FLD AUTO: 5.04 K/UL — SIGNIFICANT CHANGE UP (ref 3.8–10.5)

## 2023-09-23 PROCEDURE — 70450 CT HEAD/BRAIN W/O DYE: CPT | Mod: 26,59,MA

## 2023-09-23 PROCEDURE — 0042T: CPT | Mod: MA

## 2023-09-23 PROCEDURE — 70498 CT ANGIOGRAPHY NECK: CPT | Mod: 26,MA

## 2023-09-23 PROCEDURE — 99223 1ST HOSP IP/OBS HIGH 75: CPT

## 2023-09-23 PROCEDURE — 70496 CT ANGIOGRAPHY HEAD: CPT | Mod: 26,MA

## 2023-09-23 RX ORDER — DEXTROSE 50 % IN WATER 50 %
25 SYRINGE (ML) INTRAVENOUS ONCE
Refills: 0 | Status: DISCONTINUED | OUTPATIENT
Start: 2023-09-23 | End: 2023-09-27

## 2023-09-23 RX ORDER — INSULIN LISPRO 100/ML
VIAL (ML) SUBCUTANEOUS
Refills: 0 | Status: DISCONTINUED | OUTPATIENT
Start: 2023-09-23 | End: 2023-09-27

## 2023-09-23 RX ORDER — SODIUM CHLORIDE 9 MG/ML
1000 INJECTION, SOLUTION INTRAVENOUS
Refills: 0 | Status: DISCONTINUED | OUTPATIENT
Start: 2023-09-23 | End: 2023-09-27

## 2023-09-23 RX ORDER — DEXTROSE 50 % IN WATER 50 %
15 SYRINGE (ML) INTRAVENOUS ONCE
Refills: 0 | Status: DISCONTINUED | OUTPATIENT
Start: 2023-09-23 | End: 2023-09-27

## 2023-09-23 RX ORDER — GLUCAGON INJECTION, SOLUTION 0.5 MG/.1ML
1 INJECTION, SOLUTION SUBCUTANEOUS ONCE
Refills: 0 | Status: DISCONTINUED | OUTPATIENT
Start: 2023-09-23 | End: 2023-09-27

## 2023-09-23 RX ORDER — ATORVASTATIN CALCIUM 80 MG/1
40 TABLET, FILM COATED ORAL AT BEDTIME
Refills: 0 | Status: DISCONTINUED | OUTPATIENT
Start: 2023-09-23 | End: 2023-09-27

## 2023-09-23 RX ORDER — CARVEDILOL PHOSPHATE 80 MG/1
3.12 CAPSULE, EXTENDED RELEASE ORAL EVERY 12 HOURS
Refills: 0 | Status: DISCONTINUED | OUTPATIENT
Start: 2023-09-23 | End: 2023-09-27

## 2023-09-23 RX ORDER — DEXTROSE 50 % IN WATER 50 %
12.5 SYRINGE (ML) INTRAVENOUS ONCE
Refills: 0 | Status: DISCONTINUED | OUTPATIENT
Start: 2023-09-23 | End: 2023-09-27

## 2023-09-23 RX ORDER — INSULIN LISPRO 100/ML
VIAL (ML) SUBCUTANEOUS AT BEDTIME
Refills: 0 | Status: DISCONTINUED | OUTPATIENT
Start: 2023-09-23 | End: 2023-09-27

## 2023-09-23 RX ADMIN — CARVEDILOL PHOSPHATE 3.12 MILLIGRAM(S): 80 CAPSULE, EXTENDED RELEASE ORAL at 23:11

## 2023-09-23 RX ADMIN — ATORVASTATIN CALCIUM 40 MILLIGRAM(S): 80 TABLET, FILM COATED ORAL at 23:12

## 2023-09-23 NOTE — ED ADULT NURSE NOTE - BEFAST SPEECH SLURRED
Onset: 5/5/23  Location/description: Patient's mother reports fever than began on Friday.  Was seen in ER yesterday and vomited once at that time.  Patient had a small amount of vomiting once today.  Nasal congestion.  Shallow and rapid breathing for the last hour - has abdominal retractions.  95-98% SpO2 NOW.  Respiratory rate 62 breaths per minute, now.  Associated Symptoms: no appetite/teething  What improves/worsens symptoms: nothing  Symptom specific medications: Motrin given now - has been giving correct dose (verified with dosage able) every 6-8 hours -   Intake and Output:  and water - nursing every 2 hours- is not eating table food since yesterday.  4 wet diapers in last 12 hours - normal urination per mother.  Last BM today.  Activity level: Alert and reponsive, \"feeling down, not as active\"  Temperature (route and time): 105.0 Axillary - 104.9 rectal  Weight:   Wt Readings from Last 1 Encounters:   05/06/23 (!) 11 kg (24 lb 4.2 oz) (97 %, Z= 1.82)*     * Growth percentiles are based on WHO (Boys, 0-2 years) data.        Recent visits (last 3-4 weeks) for same reason or recent surgery:    5/6/23 - ER - viral illness and fever    PLAN:  Go to Emergency Department  Patient/Caller agrees to follow recommendations.  Encouraged to call back with any additional questions, concerns, or change in symptoms, patient verbalized understanding.    Reason for Disposition  • Rapid breathing (Breaths/min >  60 if < 2 mo;  >  50 if 2-12 mo; >  40 if 1-5 years; > 30 if 6-11 years; > 20 if > 12 years old)    Protocols used: BREATHING DIFFICULTY (RESPIRATORY DISTRESS)-P-AH       No

## 2023-09-23 NOTE — ED CDU PROVIDER INITIAL DAY NOTE - NSICDXFAMILYHX_GEN_ALL_CORE_FT
FAMILY HISTORY:  FH: diabetes mellitus    Father  Still living? Unknown  FH: stroke, Age at diagnosis: Age Unknown    Mother  Still living? Unknown  FH: stroke, Age at diagnosis: Age Unknown

## 2023-09-23 NOTE — ED PROVIDER NOTE - OBJECTIVE STATEMENT
Patient is a 53 year-old-male with history of DM, HTN and HLD presents with R facial numbness and RUE/RLE numbness/weakness. Reports that he went to sleep feeling okay around 3am however woke up around 10am with numbness and weakness in his R arm and R leg. Patient reports that he feels his symptoms are improved. Denies recent illness. Reports that his mom had stroke at age 55. Patient is a 53 year-old-male with history of DM, HTN and HLD presents with R facial numbness and RUE/RLE numbness/weakness. Reports that he went to sleep feeling okay around 3am however woke up around 10am with numbness and weakness in his R arm and R leg. Patient reports that he feels his symptoms are improved. Denies recent illness. Reports that his mom had stroke at age 55.    Attendinyo male presents with right sided facial numbness and right upper and lower extremity weakness.  symptoms have been presents since he woke up around 10am, last normal when he went to bed around 3am.  able to walk without difficulty.

## 2023-09-23 NOTE — ED CDU PROVIDER INITIAL DAY NOTE - DETAILS
JESEE/RLBROOKLYN Numbness and weakness  -MRI  -Tele  -q4hr neuro checks  -Neurology Following  Case d/w ED attending Dr. Sweeney

## 2023-09-23 NOTE — ED CDU PROVIDER INITIAL DAY NOTE - OBJECTIVE STATEMENT
53 year-old-male with history of DM, HTN and HLD presents with R facial numbness and RUE/RLE numbness/weakness. Reports that he went to sleep feeling okay around 3am however woke up around 10am with numbness and weakness in his R arm and R leg. Patient reports that he feels his symptoms are improved. Denies recent illness. Reports that his mom had stroke at age 55.    In ED code stroke called on arrival. CTH/CTA head/neck, negative for acute bleed, ischemia or significant flow-limiting stenosis. Labs not acutely actionable. Neurology evaluated pt and recommended CDU for MRI, tele, q4 hr neuro checks and frequent re-evaluations. Case d/w ED attending.

## 2023-09-23 NOTE — ED ADULT NURSE NOTE - SEPSIS REFERENCE DATA CRITERIA 2
[FreeTextEntry1] : 56 yo  in good health presenting for annual GYN visit with no acute complaints. \par \par #Health Maintenance \par - Pap performed today; counseled that if normal next pap in  \par - Requisition for mammo sent \par - Counseled on importance of colorectal screening and recommended discussing alternatives to colonoscopy with PCP/GI\par \par #Postmenopausal dyspareunia\par - Counseled on role of menopause in dyspareunia\par - Offered Estrace cream, vaginal moisturizers, lubricants\par - Patient declined treatment; states that she is no longer interested in sex with her  due to stresses placed on their marriage by COVID\par - Reiterated that treatments for dyspareunia are available should she choose to pursue them in the future\par \par DEMETRI Moralez, PGY1\par D/w Dr. Sandy
Abnormal Lactate: > 2

## 2023-09-23 NOTE — ED ADULT NURSE NOTE - CAS DISCH CONDITION
Pt stopped in  [facility] is sending him his last refills of his 2 medications  Can another script be sent in so they have it on file  Thanks! Stable

## 2023-09-23 NOTE — ED ADULT NURSE NOTE - GLASGOW COMA SCALE
baseline Post-Care Instructions: I reviewed with the patient in detail post-care instructions. Patient is not to engage in any heavy lifting, exercise, or swimming for the next 14 days. Should the patient develop any fevers, chills, bleeding, severe pain patient will contact the office immediately.

## 2023-09-23 NOTE — CONSULT NOTE ADULT - ATTENDING COMMENTS
Date of service: 9/24/2023    Pt seen and examined at bedside.     In brief, 54 yo right handed man w/ hx of HTN, DM, HLD, p/w new onset R forearm/hand and R distal LE numbness since 9/23 AM. Pt reports he has been experiencing right sided neck/shoulder pain (muscular) for several days and was seen in ED for this 9/10 and diagnosed with myofascial pain and discharged. He did add that in the past month he has been working in the yard on getting a fence set up. Pt denies any speech disturbance, facial droop, facial numbness, headaches or dizziness. He reports subjective weakness of R hand with numbness along R 4th and 5th digits and ulnar aspect of forearm and numbness over lateral aspect of leg (between and knee and ankle). No leg weakness. He denies any lower back pain or radicular symptoms in arm or leg.     This AM, patient reports leg numbness has resolved. Neurological exam without any focal weakness. Cranial nerves are intact. Speech/language intact. No weakness appreciated in UE or LE. Sensations intact to LT all over. Reflexes 2+ and symmetric throughout. Plantars down going and no clonus.    CTH and CTA H/N w/o acute pathology  MRI brain completed this AM (final report pending), on my review - no acute pathology, no acute stroke, possible some minimal microvascular ischemic changes.     Imp:- R arm numbness- 4th and 5th digits and ulnar aspect of forearm : likely C8 radiculopathy +/- cubital tunnel syndrome          L leg numbness -lateral aspect of leg (between and knee and ankle): likely compressive/ entrapment neuropathy. now resolved.           Myofascial R neck/shoulder pain    No further inpatient neurological work up needed  Recommend outpatient PT  Stretching exercises and massage  Avoid heavy lifting  No neurological contraindication to discharge  Outpatient neurology follow up- consider MRI C spine +/- EMG/NCS if symptoms do not resolve

## 2023-09-23 NOTE — ED CDU PROVIDER INITIAL DAY NOTE - PROGRESS NOTE DETAILS
Patient seen at bedside in NAD.  VSS.  Patient resting comfortably without complaints. Pt states R sided numbness significantly improved since onset. Pending MRI. Soni Blanton PA-C

## 2023-09-23 NOTE — ED ADULT NURSE NOTE - NIH STROKE SCALE: 9. BEST LANGUAGE, QM
Patient calling back and requesting for Dr Sears to place an order for [facility] there are a few Dr's there but would need an order. Also would like Dr Sears's recommendation if they would be OK?    Please contact pt back at [phone] [place] (pt) per patients request at time of call.   (0) No aphasia; normal

## 2023-09-23 NOTE — ED CDU PROVIDER INITIAL DAY NOTE - CLINICAL SUMMARY MEDICAL DECISION MAKING FREE TEXT BOX
Right arm and leg weakness, rapidly resolving.  Will get MRI brain.  neuro checks.   neurology service following.

## 2023-09-23 NOTE — CONSULT NOTE ADULT - ASSESSMENT
Assessment: 53-year-old right-handed Mandarin/English-speaking male w/ PMHx HTN, HLD, T2DM, non-smoker, not on antithrombotics, recent thyroid biopsy (6/23, no acute pathology), presenting to [facility] ED c/o numbness in RUE and RLE & mild weakness in distal RUE. Of note, patient recently evaluated at [facility] ED on 9/10/23 for R sided pain to neck s/p 6/23 L-sided neck biopsy, also c/o pain under b/l axilla and groin at that time, diagnosed as nonspecific myalgia and discharged from ED. This morning, woke up at 03:30 AM with numbness to posterior & medial aspect of RUE distal to the elbow, palmar & dorsal hand, & throughout 3rd, 4th, and 5th digits. Also c/o mild weakness in R hand. Also numbness lateral RLE from mid-thigh to mid-calf. He denies any RLE weakness or difficulty walking. He denies any symptoms in his face at that time. Of note, patient reporting that yesterday, 9/22/23, patient woke up with numbness to his entire anterior body, from face to distal feet, lasting the entire day, w/o any focal symptoms or focal weakness. This apparently lasted for the entire day. Patient was started on sertraline, which he started on 9/20/23, but only took one dose. Patient also reporting that occasionally his entire face feels numb, which occurs from time to time, starting about a year ago. Initial VS in ED: /88, HR 79.     mRS: 0  LKN: 22:00 PM 9/22/23  NIHSS: 0    Not a tenecteplase or mechanical thrombectomy candidate due to non-disabling deficits.    Impression: Onset upon awakening w/ subjective RUE numbness along C8 dermatome vs ulnar nerve sensory  + medial antebrachial cutaneous distribution; as well as subjective RLE numbness somewhat along ?L5 vs lateral femoral cutaneous distribution. Difficult to localize distribution at this time. More likely peripheral rather than central etiology. However, would rule out central etiologies, particularly given recent ED encounter for R sided symptoms.    Recommendations:    Disposition: CDU for MRI brain     Diagnostics:  [] B12, TSH, FT4, EDIE, ESR, hepatitis panel  [] MRI brain without contrast   [] Lipid panel, HbA1c  [] CBC, CMP, coagulation panel, troponin    Medications:  [] No addition of antithrombotics at this time  [] Would continue with home medications for DM, HTN, and HLD    Miscellaneous:  [] NPO unless passes dysphagia screen; swallow eval if fails  [] Normotension  [] Telemonitoring  [] Neurological checks and vital signs per unit protocol  [] BGM goals 140-180    * Case and plan not final until Attending attestation *

## 2023-09-23 NOTE — ED PROVIDER NOTE - PHYSICAL EXAMINATION
Vitals: I have reviewed the patients vital signs  General: Well dressed, well appearing, no acute distress  HEENT: Atraumatic, normocephalic, airway patent, no facial droop, no dysarthria/slurred speech noted   Eyes: EOMI, tracking appropriately  Neck: no tracheal deviation, no JVD  Chest/Lungs: no trauma, symmetric chest rise, speaking in complete sentences, no WOB  Heart: skin and extremities well perfused, regular rate and rhythm  Abdomen: soft, nontender and nondistended   Neuro: A+Ox3, ambulating without difficulty, CN grossly intact  MSK: 5/5 strength in all 4 extremities with numbness noted in R extremities but normal temperature sensation   Skin: no cyanosis, no jaundice, no new emergent lesions

## 2023-09-23 NOTE — ED ADULT NURSE NOTE - OBJECTIVE STATEMENT
53M aaox4 ambulatory came as walk in from home with c/o R arm/R leg numbness since 330am, LKW 10pm. Patient with h/o DM and HLD, on metformin. Patient follows commands, walking steady gait, strength are equal, c/o rt sided facial numbness. NIHSS initial score of 1 for numbness in 1 part of the body.  CT scan performed pending results.

## 2023-09-23 NOTE — ED PROVIDER NOTE - PROGRESS NOTE DETAILS
Jorge PGY3  Discussed results with patient including incidental thyroid nodule and patient reports that he has been following his PMD regarding it. Discussed with neuro - recommends CDU for MRI.

## 2023-09-23 NOTE — ED CDU PROVIDER INITIAL DAY NOTE - RESPIRATORY, MLM
Breath sounds clear and equal bilaterally. No wheezing, rales, or rhonchi. Propranolol Pregnancy And Lactation Text: This medication is Pregnancy Category C and it isn't known if it is safe during pregnancy. It is excreted in breast milk.

## 2023-09-23 NOTE — CONSULT NOTE ADULT - SUBJECTIVE AND OBJECTIVE BOX
Neurology - Consult Note - 09-23-23  -  Keith Yang MD  PGY-4 Neurology  Spectra: [phone] ([facility]), [phone] ([facility])  -    Name: HOPE SANCHEZ; 53y (1970)    Reason for Admission:     Chief Complaint:     HPI: 53-year-old Mandarin/English-speaking male w/ PMHx HTN, HLD, T2DM, recent thyroid biopsy (6/23, no acute pathology), presenting to [facility] ED for right-sided numbness in R face, RUE, and RLE, a/w mild weakness in R limbs as well. Of note, patient recently evaluated at [facility] ED on 9/10/23 for R sided pain to neck s/p 6/23 L-sided neck biopsy, also c/o pain under b/l axilla and groin, diagnosed as nonspecific myalgia and discharged from ED.      Review of Systems:  INCOMPLETE   CONSTITUTIONAL: No fevers or chills  EYES/ENT: No visual changes or no throat pain   NECK: No pain or stiffness  RESPIRATORY: No hemoptysis or shortness of breath  CARDIOVASCULAR: No chest pain or palpitations  GASTROINTESTINAL: No melena or hematochezia  GENITOURINARY: No dysuria or hematuria  NEUROLOGICAL: +As stated in HPI above  SKIN: No itching, burning, rashes, or lesions   All other review of systems is negative unless indicated above.    Allergies:  No Known Allergies      PMHx:   HTN (hypertension)  HLD (hyperlipidemia)  DM (diabetes mellitus)      PFHx:   FH: diabetes mellitus      PSuHx:   No significant past surgical history        Medications:  MEDICATIONS  (STANDING):    MEDICATIONS  (PRN):      Vitals:  T(C): 36.4 (09-23-23 @ 13:19), Max: 36.4 (09-23-23 @ 13:19)  HR: 79 (09-23-23 @ 13:19) (79 - 79)  BP: 145/88 (09-23-23 @ 13:19) (145/88 - 145/88)  RR: 16 (09-23-23 @ 13:19) (16 - 16)  SpO2: 96% (09-23-23 @ 13:19) (96% - 96%)    Physical Examination: INCOMPLETE  ***    Labs:          CAPILLARY BLOOD GLUCOSE      POCT Blood Glucose.: 167 mg/dL (23 Sep 2023 13:23)      Radiology:     Neurology - Consult Note - 09-23-23  -  Keith Yang MD  PGY-4 Neurology  Spectra: [phone] ([facility]), [phone] ([facility])  -    Name: HOPE SANCHEZ; 53y (1970)    Reason for Admission:     Chief Complaint:     HPI:     53-year-old right-handed Mandarin/English-speaking male w/ PMHx HTN, HLD, T2DM, non-smoker, not on antithrombotics, recent thyroid biopsy (6/23, no acute pathology), presenting to [facility] ED for numbness in RUE and RLE as well as mild weakness in distal RUE. Of note, patient recently evaluated at [facility] ED on 9/10/23 for R sided pain to neck s/p 6/23 L-sided neck biopsy, also c/o pain under b/l axilla and groin, diagnosed as nonspecific myalgia and discharged from ED.    Patient's last known normal was 22:00 PM 9/22/23 when he went to sleep. Patient woke up at 03:30 AM with numbness to primarily posterior and some medial aspect of RUE distal to the elbow, w/ most of the numbness bothering him in the palmar and dorsal hand and in 3rd, 4th, and 5th digit. He also reports mild weakness in R hand. He also reports similar onset of numbness upon waking up at 03:30 AM in his lateral RLE from mid-thigh to mid-calf. He denies any RLE weakness or difficulty walking. He denies any symptoms in his face at that time.    Of note, patient reporting that yesterday, 9/22/23, patient woke up with numbness to his entire anterior body, from face to distal feet, lasting the entire day, w/o any focal symptoms or focal weakness. This apparently lasted for the entire day. Patient was started on sertraline, which he started on 9/20/23, but only took one dose.    Patient also reporting that occasionally. his entire face feels numb. This occurs from time to time, starting about a year ago.      Review of Systems:    CONSTITUTIONAL: No fevers or chills  EYES/ENT: No visual changes or no throat pain   RESPIRATORY: No hemoptysis or shortness of breath  CARDIOVASCULAR: No chest pain or palpitations  GASTROINTESTINAL: No melena or hematochezia  GENITOURINARY: No dysuria or hematuria  NEUROLOGICAL: +As stated in HPI above  SKIN: No itching, burning, rashes, or lesions   All other review of systems is negative unless indicated above.    Allergies:  No Known Allergies      PMHx:   HTN (hypertension)  HLD (hyperlipidemia)  DM (diabetes mellitus)      PFHx:   FH: diabetes mellitus      PSuHx:   No significant past surgical history        Medications:  MEDICATIONS  (STANDING):    MEDICATIONS  (PRN):      Vitals:  T(C): 36.4 (09-23-23 @ 13:19), Max: 36.4 (09-23-23 @ 13:19)  HR: 79 (09-23-23 @ 13:19) (79 - 79)  BP: 145/88 (09-23-23 @ 13:19) (145/88 - 145/88)  RR: 16 (09-23-23 @ 13:19) (16 - 16)  SpO2: 96% (09-23-23 @ 13:19) (96% - 96%)    Physical Examination:      Constitutional: well-developed, well-nourished, well-groomed  Eyes: ophthalmoscopic exam deferred secondary to COVID-19 pandemic  Cardiovascular: no swelling, warm-to-touch    Neurological Examination:    - Mental Status: Eyes open, attends to examiner; oriented to person, age, month, year, location, and situation; speech is fluent with intact naming, intact repetition, and follows commands.    - Cranial Nerves:  II: Visual fields are full to confrontation; pupils are equal, round, and reactive to light   III, IV, VI: Extraocular movements are intact without nystagmus  V: Facial sensation is intact in the V1-V3 distribution bilaterally to temperature  VII: Face is symmetric with normal eye closure and smile  VIII: Hearing is intact to conversation  IX, X: Uvula is midline and soft palate rises symmetrically  XI: Shoulder shrug intact  XII: Tongue protrudes in the midline    - Motor/Strength Testing:  - No drifts x 4 extremities.                                   Right           Left  Deltoid                     5                 5  Biceps                      5                 5  Triceps                     5                 5  Hand                  5                 5    Hip Flex                   5                  5  Knee Ext	      5                  5  Dorsiflex                  5                  5  Plantarflex               5                  5    - Normal muscle bulk and tone throughout.    - Reflexes:   Bicep (C5/C6):                  R 2+ --- L 2+   Brachioradialis (C5/C6) :   R 2+ --- L 2+   Patella (L3/L4) :                 R 2+ --- L 2+     - Plant responses down bilaterally.    - Sensory: Intact throughout to light touch and temperature x 4 extremities.  - Coordination: Finger-nose-finger intact without ataxia or dysmetria.    - Gait: Normal steps, base, arm swing, and turning.       Labs:          CAPILLARY BLOOD GLUCOSE      POCT Blood Glucose.: 167 mg/dL (23 Sep 2023 13:23)      Radiology:    CT Code Stroke Protocol w/ IV Cont (09.23.23 @ 13:52)  IMPRESSION:    CT BRAIN WITHOUT CONTRAST:No acute intracranial hemorrhage. No large   arterial distribution acute infarct.    Findings were communicated by Dr. Behr-Ventura to Dr. Duncan Sweeney in the   emergency department at approximately 1:51 PM on 9/23/2023.    CT PERFUSION: Somewhat limited by patient motion during image acquisition.    Core infartction: 0 ml; No active ischemia determined using the   conventional parameter of Tmax greater than 6s. However, when using the   more sensitive but less accurate Tmax >4s, there is 63 mL tissue at risk   for active ischemia involving bilateral pontine and bilateral posterior   temporal/occipital/parietal brain parenchyma.  If symptoms persist consider follow up head CT or MRI, MRA  if no   contraindication.    CTA COW:  Hypoplastic but patent right A1 segment. No large vessel   occlusion. No evidence of aneurysm formation at the level of the [place]   of [place].    CTA NECK:  1. Patent bilateral common carotid arteries without hemodynamically   significant stenosis based on NASCET criteria.  2. Bilateral vertebral arteries are patent without flow limiting stenosis.   If the patient has persistent symptoms, consideration could be given to   brain MRI brain and/or MRA if clinically warranted and if there are no   MRI contraindications.  3. Prominent nodule within the left lobe of the thyroid gland measuring   2.1 x 2.3 x 3.2 cm. Recommend follow-up dedicated thyroid ultrasound   assessment on a nonacute basis for further characterization of this   finding, as clinically indicated.

## 2023-09-23 NOTE — ED PROVIDER NOTE - CLINICAL SUMMARY MEDICAL DECISION MAKING FREE TEXT BOX
Patient is a 53 year-old-male with history of DM, HTN and HLD presents with R facial numbness and RUE/RLE numbness/weakness. Code stroke activated. Neurology at bedside. Will obtain labs and CT.

## 2023-09-23 NOTE — ED PROVIDER NOTE - SEVERE SEPSIS CRITERIA MET YN (MLM)
Vaccine Information Statement(s) was given today. This has been reviewed, questions answered, and verbal consent given by Patient for injection(s) and administration of Tetanus/Diphtheria/Pertussis (Tdap).        Patient tolerated without incident. See immunization grid for documentation.     Sepsis Criteria were met:

## 2023-09-24 VITALS
OXYGEN SATURATION: 98 % | SYSTOLIC BLOOD PRESSURE: 114 MMHG | DIASTOLIC BLOOD PRESSURE: 72 MMHG | HEART RATE: 75 BPM | RESPIRATION RATE: 18 BRPM | TEMPERATURE: 98 F

## 2023-09-24 LAB
GLUCOSE BLDC GLUCOMTR-MCNC: 120 MG/DL — HIGH (ref 70–99)
GLUCOSE BLDC GLUCOMTR-MCNC: 124 MG/DL — HIGH (ref 70–99)
HAV IGM SER-ACNC: SIGNIFICANT CHANGE UP
HBV CORE IGM SER-ACNC: SIGNIFICANT CHANGE UP
HBV SURFACE AG SER-ACNC: SIGNIFICANT CHANGE UP
HCV AB S/CO SERPL IA: 0.09 S/CO — SIGNIFICANT CHANGE UP (ref 0–0.99)
HCV AB SERPL-IMP: SIGNIFICANT CHANGE UP
TSH SERPL-MCNC: 0.74 UIU/ML — SIGNIFICANT CHANGE UP (ref 0.27–4.2)
VIT B12 SERPL-MCNC: 770 PG/ML — SIGNIFICANT CHANGE UP (ref 232–1245)

## 2023-09-24 PROCEDURE — 86038 ANTINUCLEAR ANTIBODIES: CPT

## 2023-09-24 PROCEDURE — 70551 MRI BRAIN STEM W/O DYE: CPT | Mod: MG

## 2023-09-24 PROCEDURE — G0378: CPT

## 2023-09-24 PROCEDURE — 85018 HEMOGLOBIN: CPT

## 2023-09-24 PROCEDURE — 85610 PROTHROMBIN TIME: CPT

## 2023-09-24 PROCEDURE — 84295 ASSAY OF SERUM SODIUM: CPT

## 2023-09-24 PROCEDURE — 82607 VITAMIN B-12: CPT

## 2023-09-24 PROCEDURE — 99238 HOSP IP/OBS DSCHRG MGMT 30/<: CPT

## 2023-09-24 PROCEDURE — 84132 ASSAY OF SERUM POTASSIUM: CPT

## 2023-09-24 PROCEDURE — 82803 BLOOD GASES ANY COMBINATION: CPT

## 2023-09-24 PROCEDURE — 85730 THROMBOPLASTIN TIME PARTIAL: CPT

## 2023-09-24 PROCEDURE — 85025 COMPLETE CBC W/AUTO DIFF WBC: CPT

## 2023-09-24 PROCEDURE — 0042T: CPT | Mod: MA

## 2023-09-24 PROCEDURE — 80061 LIPID PANEL: CPT

## 2023-09-24 PROCEDURE — 82330 ASSAY OF CALCIUM: CPT

## 2023-09-24 PROCEDURE — G1004: CPT

## 2023-09-24 PROCEDURE — 84443 ASSAY THYROID STIM HORMONE: CPT

## 2023-09-24 PROCEDURE — 99203 OFFICE O/P NEW LOW 30 MIN: CPT

## 2023-09-24 PROCEDURE — 83036 HEMOGLOBIN GLYCOSYLATED A1C: CPT

## 2023-09-24 PROCEDURE — 83605 ASSAY OF LACTIC ACID: CPT

## 2023-09-24 PROCEDURE — 70551 MRI BRAIN STEM W/O DYE: CPT | Mod: 26,MG

## 2023-09-24 PROCEDURE — 84484 ASSAY OF TROPONIN QUANT: CPT

## 2023-09-24 PROCEDURE — 36415 COLL VENOUS BLD VENIPUNCTURE: CPT

## 2023-09-24 PROCEDURE — 85014 HEMATOCRIT: CPT

## 2023-09-24 PROCEDURE — 99285 EMERGENCY DEPT VISIT HI MDM: CPT | Mod: 25

## 2023-09-24 PROCEDURE — 80074 ACUTE HEPATITIS PANEL: CPT

## 2023-09-24 PROCEDURE — 70498 CT ANGIOGRAPHY NECK: CPT | Mod: MA

## 2023-09-24 PROCEDURE — 82962 GLUCOSE BLOOD TEST: CPT

## 2023-09-24 PROCEDURE — 80053 COMPREHEN METABOLIC PANEL: CPT

## 2023-09-24 PROCEDURE — 70496 CT ANGIOGRAPHY HEAD: CPT | Mod: MA

## 2023-09-24 PROCEDURE — 82947 ASSAY GLUCOSE BLOOD QUANT: CPT

## 2023-09-24 PROCEDURE — 84439 ASSAY OF FREE THYROXINE: CPT

## 2023-09-24 PROCEDURE — 82435 ASSAY OF BLOOD CHLORIDE: CPT

## 2023-09-24 PROCEDURE — 93005 ELECTROCARDIOGRAM TRACING: CPT

## 2023-09-24 PROCEDURE — 70450 CT HEAD/BRAIN W/O DYE: CPT | Mod: MA

## 2023-09-24 RX ADMIN — CARVEDILOL PHOSPHATE 3.12 MILLIGRAM(S): 80 CAPSULE, EXTENDED RELEASE ORAL at 11:55

## 2023-09-24 NOTE — ED ADULT NURSE REASSESSMENT NOTE - NS ED NURSE REASSESS COMMENT FT1
13.30 Pt is evaluated by CDU MD Duncan Caicedo . pt is feeling better.  Pt is discharged . Ml out   TIEN Medeiros  explained the follow up care & gave the discharge summary  . Pt has stable vitals steady gait A&OX 4 at the time of Discharge
16.00 Received the Pt from  MAITE Ulloa. Pt is Observed for Rt arm weakness for MRI  Received the Pt A&OX 4 obeys commands Racheal N/V/D fever chills cp SOB   Comfort care & safety measures continued  IV site looks clean & dry no signs of infiltration noted pt denies  pain IV site .  Pt is advised to call for help  call bell with in the reach pt verbalized the understanding .  pending CDU  MD parham . GCS 15/15 A&OX 4 PERRLA  size 3 Strong upper & lower extremities steady gait   No facial droop  No Hand Leg drop C/O Rt arm numbness tingling  MRI forms are faxed  Continue to monitor
Patient speaks Mandarin.  device used to communicate with patient.  [place], [place] [id number].
07.00 Am Received the Pt from  MAITE Barber . Pt is Observed for Rt arm weakness which is resoved now for MRI. Received the Pt A&OX 4 obeys commands Racheal N/V/D fever chills cp SOB   Comfort care & safety measures continued  IV site looks clean & dry no signs of infiltration noted pt denies  pain IV site .  Pt is advised to call for help  call bell with in the reach pt verbalized the understanding .  pending CDU  MD parham . GCS 15/15 A&OX 4 PERRLA  size 3 Strong upper & lower extremities steady gait   No facial droop  No Hand Leg drop denies numbness tingling Continue to monitor
Patient seen and evaluated by TIEN Puga and accepted to CDU for MRI of the brain. Report handoff to MAITE Landeros.
Pt received from MAITE Mahmood at 19:00hrs. Pt A&O x 4. Pt in CDU for Telemetry, neurocheck, and MRI. Pt denies any chest pain, SOB, dizziness or palpitations. V/S stable,  IV in place, patent and free of signs of infiltration. Pt resting in bed. Safety & comfort measures maintained. Call bell in reach. Educated patient to call for assistance as needed. Will continue to monitor.

## 2023-09-24 NOTE — ED CDU PROVIDER SUBSEQUENT DAY NOTE - PROGRESS NOTE DETAILS
Patient feeling well overnight, denies, tingling, paresthesias.  Denies any headaches or changes in vision.  Strength and sensation tact bilaterally in upper and lower extremities, no noted facial droop.  Plan for MRI and neurology reevaluation.

## 2023-09-24 NOTE — ED CDU PROVIDER SUBSEQUENT DAY NOTE - HISTORY
Patient seen at bedside in NAD.  VSS.  Patient resting comfortably without complaints. Pending MRI and neuro reeval. Soni Blanton PA-C

## 2023-09-24 NOTE — ED CDU PROVIDER DISPOSITION NOTE - CLINICAL COURSE
53 year-old-male with history of DM, HTN and HLD presents with R facial numbness and RUE/RLE numbness/weakness. Reports that he went to sleep feeling okay around 3am however woke up around 10am with numbness and weakness in his R arm and R leg. Patient reports that he feels his symptoms are improved. Denies recent illness. Reports that his mom had stroke at age 55.  	In ED code stroke called on arrival. CTH/CTA head/neck, negative for acute bleed, ischemia or significant flow-limiting stenosis. Labs not acutely actionable. Neurology evaluated pt and recommended CDU for MRI, tele, q4 hr neuro checks and frequent re-evaluations. Case d/w ED attending   In CDU Patient feeling well, denies any numbness, tingling, paresthesias, lipid panel normal, MRI showing mild chronic microvascular ischemic disease.  Seen by neurology, cleared for discharge with outpatient follow-up.  Also recommending sports medicine follow-up.  Discussed thyroid nodule seen on CAT scan, patient aware to follow-up with primary care doctor.  No events on telemetry, all questions answered. d/w Dr. Sweeney

## 2023-09-24 NOTE — ED CDU PROVIDER DISPOSITION NOTE - NSFOLLOWUPCLINICS_GEN_ALL_ED_FT
[facility]  Neurology  [address] [place] [address] [address] [place] [address]  Phone: [phone]  Fax:   Follow Up Time: 1-3 Days

## 2023-09-24 NOTE — ED CDU PROVIDER DISPOSITION NOTE - PATIENT PORTAL LINK FT
You can access the FollowMyHealth Patient Portal offered by [facility] by registering at the following website: [email or web address]. By joining [facility]’s FollowMyHealth portal, you will also be able to view your health information using other applications (apps) compatible with our system.

## 2023-09-24 NOTE — ED CDU PROVIDER DISPOSITION NOTE - NSFOLLOWUPINSTRUCTIONS_ED_ALL_ED_FT
- stay hydrated.   -take all home medications as prescribed  - take tylenol 975mg and ibuprofen 600mg every 6 hours as needed for pain-take with meals.  - follow up with your primary care provider in 1-2 days For evaluation of thyroid nodule, bring copy of all results with you    Follow-up with sports medicine clinic, call [phone] to make an appointment    Follow-up with the neurology clinic, call number below to make an appointment  [facility]  Neurology  [address] [place] [address] [address] [place] [address]  Phone: [phone]    - return if symptoms worsen, fever, weakness, numbness/tingling, blurred vision, difficulty ambulating, slurred speech and all other concerns.

## 2023-09-25 LAB — ANA TITR SER: NEGATIVE — SIGNIFICANT CHANGE UP

## 2024-07-30 NOTE — H&P PST ADULT - BP NONINVASIVE MEAN (MM HG)
I have reviewed discharge instructions with the patient. The patient verbalized understanding. Vinay NUNEZ 106

## 2024-10-02 NOTE — ED ADULT NURSE NOTE - NSIMPLEMENTINTERV_GEN_ALL_ED
No Implemented All Universal Safety Interventions:  [place] to call system. Call bell, personal items and telephone within reach. Instruct patient to call for assistance. Room bathroom lighting operational. Non-slip footwear when patient is off stretcher. Physically safe environment: no spills, clutter or unnecessary equipment. Stretcher in lowest position, wheels locked, appropriate side rails in place.